# Patient Record
Sex: FEMALE | Race: WHITE | NOT HISPANIC OR LATINO
[De-identification: names, ages, dates, MRNs, and addresses within clinical notes are randomized per-mention and may not be internally consistent; named-entity substitution may affect disease eponyms.]

---

## 2017-08-08 ENCOUNTER — APPOINTMENT (OUTPATIENT)
Dept: OBGYN | Facility: CLINIC | Age: 57
End: 2017-08-08
Payer: COMMERCIAL

## 2017-08-08 VITALS
DIASTOLIC BLOOD PRESSURE: 82 MMHG | HEIGHT: 64 IN | SYSTOLIC BLOOD PRESSURE: 124 MMHG | HEART RATE: 79 BPM | WEIGHT: 127 LBS | BODY MASS INDEX: 21.68 KG/M2 | OXYGEN SATURATION: 98 %

## 2017-08-08 DIAGNOSIS — Z87.891 PERSONAL HISTORY OF NICOTINE DEPENDENCE: ICD-10-CM

## 2017-08-08 DIAGNOSIS — Q43.8 OTHER SPECIFIED CONGENITAL MALFORMATIONS OF INTESTINE: ICD-10-CM

## 2017-08-08 DIAGNOSIS — Z83.3 FAMILY HISTORY OF DIABETES MELLITUS: ICD-10-CM

## 2017-08-08 DIAGNOSIS — Z82.49 FAMILY HISTORY OF ISCHEMIC HEART DISEASE AND OTHER DISEASES OF THE CIRCULATORY SYSTEM: ICD-10-CM

## 2017-08-08 DIAGNOSIS — Z80.3 FAMILY HISTORY OF MALIGNANT NEOPLASM OF BREAST: ICD-10-CM

## 2017-08-08 PROCEDURE — 99203 OFFICE O/P NEW LOW 30 MIN: CPT

## 2017-08-10 LAB — HPV HIGH+LOW RISK DNA PNL CVX: NEGATIVE

## 2017-08-13 LAB — CYTOLOGY CVX/VAG DOC THIN PREP: NORMAL

## 2017-09-06 DIAGNOSIS — N39.0 URINARY TRACT INFECTION, SITE NOT SPECIFIED: ICD-10-CM

## 2018-11-12 ENCOUNTER — APPOINTMENT (OUTPATIENT)
Dept: OBGYN | Facility: CLINIC | Age: 58
End: 2018-11-12
Payer: COMMERCIAL

## 2018-11-12 VITALS
HEART RATE: 72 BPM | DIASTOLIC BLOOD PRESSURE: 80 MMHG | SYSTOLIC BLOOD PRESSURE: 126 MMHG | OXYGEN SATURATION: 99 % | WEIGHT: 130 LBS | HEIGHT: 64 IN | BODY MASS INDEX: 22.2 KG/M2

## 2018-11-12 DIAGNOSIS — K62.4 STENOSIS OF ANUS AND RECTUM: ICD-10-CM

## 2018-11-12 PROCEDURE — 99214 OFFICE O/P EST MOD 30 MIN: CPT | Mod: 25

## 2018-11-12 PROCEDURE — 99396 PREV VISIT EST AGE 40-64: CPT

## 2018-11-12 RX ORDER — NITROFURANTOIN MACROCRYSTALS 100 MG/1
100 CAPSULE ORAL
Qty: 1 | Refills: 3 | Status: DISCONTINUED | COMMUNITY
Start: 2017-09-06 | End: 2018-11-12

## 2018-11-13 LAB — HPV HIGH+LOW RISK DNA PNL CVX: NOT DETECTED

## 2018-11-14 LAB — CYTOLOGY CVX/VAG DOC THIN PREP: NORMAL

## 2018-11-27 ENCOUNTER — APPOINTMENT (OUTPATIENT)
Dept: GASTROENTEROLOGY | Facility: CLINIC | Age: 58
End: 2018-11-27
Payer: COMMERCIAL

## 2018-11-27 VITALS
SYSTOLIC BLOOD PRESSURE: 120 MMHG | BODY MASS INDEX: 22.53 KG/M2 | HEIGHT: 64 IN | WEIGHT: 132 LBS | DIASTOLIC BLOOD PRESSURE: 90 MMHG | OXYGEN SATURATION: 98 % | TEMPERATURE: 98.1 F | HEART RATE: 72 BPM

## 2018-11-27 DIAGNOSIS — Z00.00 ENCOUNTER FOR GENERAL ADULT MEDICAL EXAMINATION W/OUT ABNORMAL FINDINGS: ICD-10-CM

## 2018-11-27 PROCEDURE — 99204 OFFICE O/P NEW MOD 45 MIN: CPT

## 2018-11-29 LAB
ALBUMIN SERPL ELPH-MCNC: 4.5 G/DL
ALP BLD-CCNC: 89 U/L
ALT SERPL-CCNC: 56 U/L
ANION GAP SERPL CALC-SCNC: 12 MMOL/L
AST SERPL-CCNC: 39 U/L
BASOPHILS # BLD AUTO: 0.04 K/UL
BASOPHILS NFR BLD AUTO: 0.6 %
BILIRUB SERPL-MCNC: 0.3 MG/DL
BUN SERPL-MCNC: 12 MG/DL
CALCIUM SERPL-MCNC: 10.1 MG/DL
CHLORIDE SERPL-SCNC: 105 MMOL/L
CO2 SERPL-SCNC: 24 MMOL/L
CREAT SERPL-MCNC: 0.76 MG/DL
EOSINOPHIL # BLD AUTO: 0.15 K/UL
EOSINOPHIL NFR BLD AUTO: 2.2 %
GLUCOSE SERPL-MCNC: 79 MG/DL
HAV IGM SER QL: NONREACTIVE
HBV CORE IGG+IGM SER QL: NONREACTIVE
HBV SURFACE AB SER QL: NONREACTIVE
HBV SURFACE AG SER QL: NONREACTIVE
HCT VFR BLD CALC: 38.8 %
HCV AB SER QL: NONREACTIVE
HCV S/CO RATIO: 0.18 S/CO
HGB BLD-MCNC: 12.6 G/DL
IMM GRANULOCYTES NFR BLD AUTO: 0 %
LYMPHOCYTES # BLD AUTO: 2.08 K/UL
LYMPHOCYTES NFR BLD AUTO: 30.3 %
MAN DIFF?: NORMAL
MCHC RBC-ENTMCNC: 30.5 PG
MCHC RBC-ENTMCNC: 32.5 GM/DL
MCV RBC AUTO: 93.9 FL
MONOCYTES # BLD AUTO: 0.46 K/UL
MONOCYTES NFR BLD AUTO: 6.7 %
NEUTROPHILS # BLD AUTO: 4.13 K/UL
NEUTROPHILS NFR BLD AUTO: 60.2 %
PLATELET # BLD AUTO: 236 K/UL
POTASSIUM SERPL-SCNC: 4.4 MMOL/L
PROT SERPL-MCNC: 7.4 G/DL
RBC # BLD: 4.13 M/UL
RBC # FLD: 12.6 %
SODIUM SERPL-SCNC: 141 MMOL/L
WBC # FLD AUTO: 6.86 K/UL

## 2019-01-09 ENCOUNTER — APPOINTMENT (OUTPATIENT)
Dept: GASTROENTEROLOGY | Facility: CLINIC | Age: 59
End: 2019-01-09
Payer: COMMERCIAL

## 2019-01-09 VITALS
DIASTOLIC BLOOD PRESSURE: 80 MMHG | TEMPERATURE: 98 F | BODY MASS INDEX: 22.71 KG/M2 | WEIGHT: 133 LBS | SYSTOLIC BLOOD PRESSURE: 120 MMHG | OXYGEN SATURATION: 98 % | HEIGHT: 64 IN | HEART RATE: 70 BPM

## 2019-01-09 PROCEDURE — 99213 OFFICE O/P EST LOW 20 MIN: CPT

## 2019-01-09 NOTE — PHYSICAL EXAM
[General Appearance - Alert] : alert [General Appearance - In No Acute Distress] : in no acute distress [Sclera] : the sclera and conjunctiva were normal [PERRL With Normal Accommodation] : pupils were equal in size, round, and reactive to light [Extraocular Movements] : extraocular movements were intact [Outer Ear] : the ears and nose were normal in appearance [Oropharynx] : the oropharynx was normal [Neck Appearance] : the appearance of the neck was normal [Neck Cervical Mass (___cm)] : no neck mass was observed [Jugular Venous Distention Increased] : there was no jugular-venous distention [Thyroid Diffuse Enlargement] : the thyroid was not enlarged [Thyroid Nodule] : there were no palpable thyroid nodules [Auscultation Breath Sounds / Voice Sounds] : lungs were clear to auscultation bilaterally [Heart Rate And Rhythm] : heart rate was normal and rhythm regular [Heart Sounds] : normal S1 and S2 [Heart Sounds Gallop] : no gallops [Murmurs] : no murmurs [Heart Sounds Pericardial Friction Rub] : no pericardial rub [Edema] : there was no peripheral edema [Veins - Varicosity Changes] : there were no varicosital changes [Bowel Sounds] : normal bowel sounds [Abdomen Soft] : soft [Abdomen Tenderness] : non-tender [Abdomen Mass (___ Cm)] : no abdominal mass palpated [Cervical Lymph Nodes Enlarged Posterior Bilaterally] : posterior cervical [Cervical Lymph Nodes Enlarged Anterior Bilaterally] : anterior cervical [Supraclavicular Lymph Nodes Enlarged Bilaterally] : supraclavicular [No CVA Tenderness] : no ~M costovertebral angle tenderness [No Spinal Tenderness] : no spinal tenderness [Abnormal Walk] : normal gait [Nail Clubbing] : no clubbing  or cyanosis of the fingernails [Musculoskeletal - Swelling] : no joint swelling seen [Motor Tone] : muscle strength and tone were normal [Skin Color & Pigmentation] : normal skin color and pigmentation [Skin Turgor] : normal skin turgor [] : no rash [Deep Tendon Reflexes (DTR)] : deep tendon reflexes were 2+ and symmetric [Sensation] : the sensory exam was normal to light touch and pinprick [No Focal Deficits] : no focal deficits [Oriented To Time, Place, And Person] : oriented to person, place, and time [Impaired Insight] : insight and judgment were intact [Affect] : the affect was normal

## 2019-01-09 NOTE — HISTORY OF PRESENT ILLNESS
[FreeTextEntry1] : Patient presents back to the office today for followup and discussion. At last visit she had elevated transaminase level with otherwise negative testing. The patient is feeling well without complaint.\par \par

## 2019-01-09 NOTE — ASSESSMENT
[FreeTextEntry1] : Impression\par \par Patient presents today for followup of elevated transaminase levels. I will offer additional blood work today to include more exotic testing. Patient and I reviewed recent blood work as well sonogram showing no obvious cause for elevated reading. Patient will contact me for above results will decide what if any investigation will be performed

## 2019-01-22 LAB
ALBUMIN SERPL ELPH-MCNC: 4.7 G/DL
ALP BLD-CCNC: 90 U/L
ALT SERPL-CCNC: 55 U/L
ANA SER IF-ACNC: NEGATIVE
ANION GAP SERPL CALC-SCNC: 13 MMOL/L
AST SERPL-CCNC: 35 U/L
BILIRUB SERPL-MCNC: 0.5 MG/DL
BUN SERPL-MCNC: 15 MG/DL
CALCIUM SERPL-MCNC: 9.9 MG/DL
CERULOPLASMIN SERPL-MCNC: 28 MG/DL
CHLORIDE SERPL-SCNC: 103 MMOL/L
CO2 SERPL-SCNC: 25 MMOL/L
CREAT SERPL-MCNC: 0.78 MG/DL
ENDOMYSIUM IGA SER QL: POSITIVE
ENDOMYSIUM IGA TITR SER: ABNORMAL
FERRITIN SERPL-MCNC: 103 NG/ML
GLIADIN IGA SER QL: 12 UNITS
GLIADIN IGG SER QL: 21.9 UNITS
GLIADIN PEPTIDE IGA SER-ACNC: NEGATIVE
GLIADIN PEPTIDE IGG SER-ACNC: ABNORMAL
GLUCOSE SERPL-MCNC: 88 MG/DL
IGA SER QL IEP: 262 MG/DL
IRON SATN MFR SERPL: 32 %
IRON SERPL-MCNC: 122 UG/DL
MITOCHONDRIA AB SER IF-ACNC: NORMAL
POTASSIUM SERPL-SCNC: 4.3 MMOL/L
PROT SERPL-MCNC: 7.9 G/DL
SODIUM SERPL-SCNC: 141 MMOL/L
TIBC SERPL-MCNC: 377 UG/DL
TTG IGA SER IA-ACNC: 93.4 UNITS
TTG IGA SER-ACNC: POSITIVE
TTG IGG SER IA-ACNC: 8.9 UNITS
TTG IGG SER IA-ACNC: NEGATIVE
UIBC SERPL-MCNC: 255 UG/DL

## 2019-05-09 ENCOUNTER — APPOINTMENT (OUTPATIENT)
Dept: GASTROENTEROLOGY | Facility: AMBULATORY MEDICAL SERVICES | Age: 59
End: 2019-05-09
Payer: COMMERCIAL

## 2019-05-09 PROCEDURE — 43239 EGD BIOPSY SINGLE/MULTIPLE: CPT

## 2019-05-09 PROCEDURE — 45380 COLONOSCOPY AND BIOPSY: CPT

## 2019-05-15 ENCOUNTER — CLINICAL ADVICE (OUTPATIENT)
Age: 59
End: 2019-05-15

## 2019-06-11 ENCOUNTER — LABORATORY RESULT (OUTPATIENT)
Age: 59
End: 2019-06-11

## 2019-06-11 ENCOUNTER — APPOINTMENT (OUTPATIENT)
Dept: GASTROENTEROLOGY | Facility: CLINIC | Age: 59
End: 2019-06-11
Payer: COMMERCIAL

## 2019-06-11 VITALS
SYSTOLIC BLOOD PRESSURE: 110 MMHG | HEART RATE: 78 BPM | WEIGHT: 131 LBS | HEIGHT: 64 IN | TEMPERATURE: 98.5 F | BODY MASS INDEX: 22.36 KG/M2 | DIASTOLIC BLOOD PRESSURE: 80 MMHG | OXYGEN SATURATION: 98 %

## 2019-06-11 DIAGNOSIS — K57.30 DIVERTICULOSIS OF LARGE INTESTINE W/OUT PERFORATION OR ABSCESS W/OUT BLEEDING: ICD-10-CM

## 2019-06-11 PROCEDURE — 99215 OFFICE O/P EST HI 40 MIN: CPT | Mod: 25

## 2019-06-11 PROCEDURE — 36415 COLL VENOUS BLD VENIPUNCTURE: CPT

## 2019-06-11 RX ORDER — SODIUM SULFATE, POTASSIUM SULFATE, MAGNESIUM SULFATE 17.5; 3.13; 1.6 G/ML; G/ML; G/ML
17.5-3.13-1.6 SOLUTION, CONCENTRATE ORAL
Qty: 1 | Refills: 0 | Status: DISCONTINUED | COMMUNITY
Start: 2019-03-25 | End: 2019-06-11

## 2019-06-11 RX ORDER — CONJUGATED ESTROGENS 0.62 MG/G
0.62 CREAM VAGINAL
Qty: 42.5 | Refills: 6 | Status: DISCONTINUED | COMMUNITY
Start: 2017-08-08 | End: 2019-06-11

## 2019-06-11 NOTE — PHYSICAL EXAM
[General Appearance - Alert] : alert [Neck Appearance] : the appearance of the neck was normal [General Appearance - In No Acute Distress] : in no acute distress [Neck Cervical Mass (___cm)] : no neck mass was observed [Jugular Venous Distention Increased] : there was no jugular-venous distention [Thyroid Nodule] : there were no palpable thyroid nodules [Thyroid Diffuse Enlargement] : the thyroid was not enlarged [Auscultation Breath Sounds / Voice Sounds] : lungs were clear to auscultation bilaterally [Heart Rate And Rhythm] : heart rate was normal and rhythm regular [Heart Sounds] : normal S1 and S2 [Murmurs] : no murmurs [Heart Sounds Gallop] : no gallops [Heart Sounds Pericardial Friction Rub] : no pericardial rub [Edema] : there was no peripheral edema [Bowel Sounds] : normal bowel sounds [Abdomen Soft] : soft [Abdomen Tenderness] : non-tender [Abdomen Mass (___ Cm)] : no abdominal mass palpated [] : no hepato-splenomegaly [No CVA Tenderness] : no ~M costovertebral angle tenderness [No Spinal Tenderness] : no spinal tenderness [Oriented To Time, Place, And Person] : oriented to person, place, and time [Affect] : the affect was normal [Impaired Insight] : insight and judgment were intact

## 2019-06-13 PROBLEM — K57.30 DIVERTICULOSIS OF LARGE INTESTINE WITHOUT HEMORRHAGE: Status: ACTIVE | Noted: 2019-06-13

## 2019-06-13 NOTE — CONSULT LETTER
[FreeTextEntry1] : Dear Dr. Nancy Cardenas,\par \par I had the pleasure of seeing your patient GLENYS EARL in the office today.  My office note is attached.\par \par Thank you very much for allowing me to participate in the care of your patient.\par \par Sincerely,\par \par Parker Amanda M.D., FAC, FACP\par Director, Celiac Program at [facility]\par  of Medicine\par [employer] at [facility] Practice Director,\par [facility] - Gastroenterology/Internal Medicine at [place]\[facility] [address]\[place] [address]\par Tel: [phone]\par Email: [email or web address]\par \par \par The attached note has been created using a voice recognition system (Dragon).  There may be some misspellings and typos.  Please call my office if you have any issues or questions.

## 2019-06-13 NOTE — HISTORY OF PRESENT ILLNESS
[FreeTextEntry1] : The patient is a 58-year-old woman previously followed by my partner Dr. Alicia who is referred to me because of a new diagnosis of celiac disease. I reviewed her blood work from January 9, 2019 which revealed a tissue transglutaminase IgA of 93.4 with an elevated endomysial antibody and an ALT of 55. The patient underwent EGD and colonoscopy on May 9, 2019. EGD showed possible scalloping along with a hiatal hernia. Biopsies showed mildly increased intraepithelial lymphocytes in the distal duodenum and duodenal bulb. Colonoscopy was significant for diverticulosis and a hyperplastic polyp.\par \par The patient has been on a gluten-free diet. She denies abdominal pain, bloating, heartburn, dysphagia. She has one solid bowel movement a day without melena or prior blood per rectum. She has lost 5 pounds intentionally. She denies any rashes, joint pains, or dizziness. The patient has a history of osteopenia and had a bone density scan within the past year.\par \par  The patient has not been hospitalized in the past year and denies any cardiac issues.

## 2019-06-13 NOTE — ASSESSMENT
[FreeTextEntry1] : Patient with celiac serologies but a markedly abnormal suggestive of celiac disease. Endoscopic biopsies show mildly increased intraepithelial lymphocytes, a finding that can be seen in celiac disease but is not diagnostic of that. With the combination of the blood tests and biopsy results, assuming that the patient's celiac genetic testing is positive, this is consistent with a diagnosis of celiac disease. The patient has an elevated ALT. She had a recent colonoscopy revealing diverticulosis.\par \par I had a long discussion with the patient regarding celiac disease, the gluten-free diet, the concepts of contact and cross contamination, and the potential complications of celiac disease. We also discussed the need for ongoing followup.\par \par Information was given to the patient regarding celiac disease and a gluten free diet. The patient was also given a list of local restaurants that are "celiac friendly".\par \par Blood work was sent for LFTs, PT, alpha-1 antitrypsin, alpha-fetoprotein, VENESSA, ANCA, ceruloplasmin, iron studies, GGTP, celiac markers, hepatitis A., B., C. serologies, lipid profile, AMA, anti-smooth muscle antibody, anti-LKM antibody, anti-soluble liver antigen antibody, CBC, chem-pack, TSH, B12, folate, vitamin D, and magnesium and celiac HLA testing.\par \par The patient was given the name of a nutitionist, Wily Reeves, to see.\par \par Information was given to the patient regarding diverticulosis and a high-fiber diet.\par \par We will repeat a colonoscopy in 5-10 years.\par \par Patient returned to see me in 2 months.

## 2019-06-15 LAB
25(OH)D3 SERPL-MCNC: 34.5 NG/ML
A1AT SERPL-MCNC: 142 MG/DL
AFP-TM SERPL-MCNC: 4 NG/ML
ALBUMIN SERPL ELPH-MCNC: 4.6 G/DL
ALP BLD-CCNC: 91 U/L
ALT SERPL-CCNC: 26 U/L
ANA SER IF-ACNC: NEGATIVE
ANION GAP SERPL CALC-SCNC: 14 MMOL/L
AST SERPL-CCNC: 15 U/L
BASOPHILS # BLD AUTO: 0.04 K/UL
BASOPHILS NFR BLD AUTO: 0.7 %
BILIRUB SERPL-MCNC: 0.3 MG/DL
BUN SERPL-MCNC: 11 MG/DL
CALCIUM SERPL-MCNC: 10 MG/DL
CERULOPLASMIN SERPL-MCNC: 26 MG/DL
CHLORIDE SERPL-SCNC: 104 MMOL/L
CHOLEST SERPL-MCNC: 162 MG/DL
CHOLEST/HDLC SERPL: 3.3 RATIO
CO2 SERPL-SCNC: 24 MMOL/L
CREAT SERPL-MCNC: 0.77 MG/DL
ENDOMYSIUM IGA SER QL: NEGATIVE
ENDOMYSIUM IGA TITR SER: NORMAL
EOSINOPHIL # BLD AUTO: 0.18 K/UL
EOSINOPHIL NFR BLD AUTO: 2.9 %
FERRITIN SERPL-MCNC: 82 NG/ML
FOLATE SERPL-MCNC: 17.3 NG/ML
GGT SERPL-CCNC: 37 U/L
GLIADIN IGA SER QL: 7.9 UNITS
GLIADIN IGG SER QL: 24.3 UNITS
GLIADIN PEPTIDE IGA SER-ACNC: NEGATIVE
GLIADIN PEPTIDE IGG SER-ACNC: ABNORMAL
GLUCOSE SERPL-MCNC: 102 MG/DL
HBV CORE IGG+IGM SER QL: NONREACTIVE
HBV SURFACE AB SER QL: NONREACTIVE
HBV SURFACE AG SER QL: NONREACTIVE
HCT VFR BLD CALC: 41.9 %
HCV AB SER QL: NONREACTIVE
HCV S/CO RATIO: 0.19 S/CO
HDLC SERPL-MCNC: 49 MG/DL
HEPATITIS A IGG ANTIBODY: NONREACTIVE
HGB BLD-MCNC: 13.5 G/DL
IGA SER QL IEP: 241 MG/DL
IGG SER QL IEP: 1098 MG/DL
IMM GRANULOCYTES NFR BLD AUTO: 0.3 %
INR PPP: 1.03 RATIO
IRON SATN MFR SERPL: 28 %
IRON SERPL-MCNC: 89 UG/DL
LDLC SERPL CALC-MCNC: 87 MG/DL
LKM AB SER QL IF: <20.1 UNITS
LYMPHOCYTES # BLD AUTO: 1.89 K/UL
LYMPHOCYTES NFR BLD AUTO: 30.8 %
MAGNESIUM SERPL-MCNC: 2.1 MG/DL
MAN DIFF?: NORMAL
MCHC RBC-ENTMCNC: 30.6 PG
MCHC RBC-ENTMCNC: 32.2 GM/DL
MCV RBC AUTO: 95 FL
MITOCHONDRIA AB SER IF-ACNC: NORMAL
MONOCYTES # BLD AUTO: 0.45 K/UL
MONOCYTES NFR BLD AUTO: 7.3 %
MPO AB + PR3 PNL SER: NORMAL
NEUTROPHILS # BLD AUTO: 3.56 K/UL
NEUTROPHILS NFR BLD AUTO: 58 %
PLATELET # BLD AUTO: 199 K/UL
POTASSIUM SERPL-SCNC: 4.5 MMOL/L
PROT SERPL-MCNC: 7.2 G/DL
PT BLD: 11.6 SEC
RBC # BLD: 4.41 M/UL
RBC # FLD: 12 %
SMOOTH MUSCLE AB SER QL IF: ABNORMAL
SODIUM SERPL-SCNC: 142 MMOL/L
SOLUBLE LIVER IGG SER IA-ACNC: < 20.1 UNITS
TIBC SERPL-MCNC: 321 UG/DL
TRIGL SERPL-MCNC: 130 MG/DL
TSH SERPL-ACNC: 1.28 UIU/ML
TTG IGA SER IA-ACNC: 7.5 U/ML
TTG IGA SER-ACNC: ABNORMAL
TTG IGG SER IA-ACNC: 6.6 U/ML
TTG IGG SER IA-ACNC: ABNORMAL
UIBC SERPL-MCNC: 232 UG/DL
VIT B12 SERPL-MCNC: 474 PG/ML
WBC # FLD AUTO: 6.14 K/UL

## 2019-07-02 LAB — CELIAC GENETICS PROMETHEUS: NORMAL

## 2019-08-01 ENCOUNTER — APPOINTMENT (OUTPATIENT)
Dept: GASTROENTEROLOGY | Facility: CLINIC | Age: 59
End: 2019-08-01
Payer: COMMERCIAL

## 2019-08-01 VITALS
DIASTOLIC BLOOD PRESSURE: 80 MMHG | SYSTOLIC BLOOD PRESSURE: 118 MMHG | HEIGHT: 64 IN | WEIGHT: 128 LBS | TEMPERATURE: 98.4 F | BODY MASS INDEX: 21.85 KG/M2 | HEART RATE: 90 BPM | OXYGEN SATURATION: 99 %

## 2019-08-01 PROCEDURE — 36415 COLL VENOUS BLD VENIPUNCTURE: CPT

## 2019-08-01 PROCEDURE — 99214 OFFICE O/P EST MOD 30 MIN: CPT | Mod: 25

## 2019-08-01 NOTE — PHYSICAL EXAM
[Neck Appearance] : the appearance of the neck was normal [General Appearance - Alert] : alert [General Appearance - In No Acute Distress] : in no acute distress [Neck Cervical Mass (___cm)] : no neck mass was observed [Jugular Venous Distention Increased] : there was no jugular-venous distention [Thyroid Diffuse Enlargement] : the thyroid was not enlarged [Thyroid Nodule] : there were no palpable thyroid nodules [Auscultation Breath Sounds / Voice Sounds] : lungs were clear to auscultation bilaterally [Heart Sounds] : normal S1 and S2 [Heart Rate And Rhythm] : heart rate was normal and rhythm regular [Murmurs] : no murmurs [Heart Sounds Gallop] : no gallops [Heart Sounds Pericardial Friction Rub] : no pericardial rub [Edema] : there was no peripheral edema [Abdomen Soft] : soft [Bowel Sounds] : normal bowel sounds [Abdomen Tenderness] : non-tender [] : no hepato-splenomegaly [Abdomen Mass (___ Cm)] : no abdominal mass palpated [No CVA Tenderness] : no ~M costovertebral angle tenderness [No Spinal Tenderness] : no spinal tenderness [Oriented To Time, Place, And Person] : oriented to person, place, and time [Impaired Insight] : insight and judgment were intact [Affect] : the affect was normal

## 2019-08-02 NOTE — ASSESSMENT
[FreeTextEntry1] : Patient with celiac disease whose labs are improving but not yet normalized. She appears to be 100% gluten free diet and is being followed by a nutritionist.\par \par I had a long discussion with the patient regarding celiac disease, the gluten-free diet, and the concepts of contact and cross contamination.\par \par Bloodwork was sent for CBC, chem-pack, TSH, celiac markers, iron studies, B12, folate, vitamin D, and magnesium.\par \par Patient will return to see me in 4 months.\par \par We will plan on repeating EGD in May 2020 and colonoscopy in May 2024.\par \par \par Plan from 6/11/19 - Patient with celiac serologies but a markedly abnormal suggestive of celiac disease. Endoscopic biopsies show mildly increased intraepithelial lymphocytes, a finding that can be seen in celiac disease but is not diagnostic of that. With the combination of the blood tests and biopsy results, assuming that the patient's celiac genetic testing is positive, this is consistent with a diagnosis of celiac disease. The patient has an elevated ALT. She had a recent colonoscopy revealing diverticulosis.\par \par I had a long discussion with the patient regarding celiac disease, the gluten-free diet, the concepts of contact and cross contamination, and the potential complications of celiac disease. We also discussed the need for ongoing followup.\par \par Information was given to the patient regarding celiac disease and a gluten free diet. The patient was also given a list of local restaurants that are "celiac friendly".\par \par Blood work was sent for LFTs, PT, alpha-1 antitrypsin, alpha-fetoprotein, VENESSA, ANCA, ceruloplasmin, iron studies, GGTP, celiac markers, hepatitis A., B., C. serologies, lipid profile, AMA, anti-smooth muscle antibody, anti-LKM antibody, anti-soluble liver antigen antibody, CBC, chem-pack, TSH, B12, folate, vitamin D, and magnesium and celiac HLA testing.\par \par The patient was given the name of a nutitionist, Wily Reeves, to see.\par \par Information was given to the patient regarding diverticulosis and a high-fiber diet.\par \par We will repeat a colonoscopy in 5-10 years.\par \par Patient returned to see me in 2 months.

## 2019-08-02 NOTE — HISTORY OF PRESENT ILLNESS
[FreeTextEntry1] : We reviewed the blood work from the patient's last visit on June 11, 2019. There is improvement of the celiac labs although the tissue transglutaminase IgA weakly positive as is the tissue transglutaminase IgG and the anti-deamidated gliadin antibody IgG. 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 The patient states that she has been on a 100% gluten free diet. She saw the nutritionist and understands the requirements of the diet. She denies abdominal pain or bloating. She has one solid bowel movement a day without melena or bright blood per rectum. Patient has lost 3 pounds intentionally.\par \par \par Note from 6/11/19 - The patient is a 58-year-old woman previously followed by my partner Dr. Alicia who is referred to me because of a new diagnosis of celiac disease. I reviewed her blood work from January 9, 2019 which revealed a tissue transglutaminase IgA of 93.4 with an elevated endomysial antibody and an ALT of 55. The patient underwent EGD and colonoscopy on May 9, 2019. EGD showed possible scalloping along with a hiatal hernia. Biopsies showed mildly increased intraepithelial lymphocytes in the distal duodenum and duodenal bulb. Colonoscopy was significant for diverticulosis and a hyperplastic polyp.\par \par The patient has been on a gluten-free diet. She denies abdominal pain, bloating, heartburn, dysphagia. She has one solid bowel movement a day without melena or prior blood per rectum. She has lost 5 pounds intentionally. She denies any rashes, joint pains, or dizziness. The patient has a history of osteopenia and had a bone density scan within the past year.\par \par  The patient has not been hospitalized in the past year and denies any cardiac issues.

## 2019-08-02 NOTE — CONSULT LETTER
[FreeTextEntry1] : Dear Dr. Nancy Cardenas,\par \par I had the pleasure of seeing your patient GLENYS EARL in the office today.  My office note is attached.\par \par Thank you very much for allowing me to participate in the care of your patient.\par \par Sincerely,\par \par Parker Amanda M.D., FAC, FACP\par Director, Celiac Program at [facility]\par  of Medicine\par [employer] at [facility] Practice Director,\par [facility] - Gastroenterology/Internal Medicine at [place]\[facility] [address]\[place] [address]\par Tel: [phone]\par Email: [email or web address]\par \par \par The attached note has been created using a voice recognition system (Dragon).  There may be some misspellings and typos.  Please call my office if you have any issues or questions.

## 2019-08-07 LAB
ENDOMYSIUM IGA SER QL: NEGATIVE
ENDOMYSIUM IGA TITR SER: NORMAL
GLIADIN IGA SER QL: 7.4 UNITS
GLIADIN IGG SER QL: 14.6 UNITS
GLIADIN PEPTIDE IGA SER-ACNC: NEGATIVE
GLIADIN PEPTIDE IGG SER-ACNC: NEGATIVE
IGA SER QL IEP: 232 MG/DL
TTG IGA SER IA-ACNC: 4.9 U/ML
TTG IGA SER-ACNC: ABNORMAL
TTG IGG SER IA-ACNC: 6.5 U/ML
TTG IGG SER IA-ACNC: ABNORMAL

## 2019-12-05 ENCOUNTER — RX RENEWAL (OUTPATIENT)
Age: 59
End: 2019-12-05

## 2019-12-05 DIAGNOSIS — Z86.19 PERSONAL HISTORY OF OTHER INFECTIOUS AND PARASITIC DISEASES: ICD-10-CM

## 2019-12-09 ENCOUNTER — APPOINTMENT (OUTPATIENT)
Dept: GASTROENTEROLOGY | Facility: CLINIC | Age: 59
End: 2019-12-09
Payer: COMMERCIAL

## 2019-12-09 VITALS
HEART RATE: 73 BPM | SYSTOLIC BLOOD PRESSURE: 112 MMHG | TEMPERATURE: 98.1 F | BODY MASS INDEX: 22.71 KG/M2 | WEIGHT: 133 LBS | DIASTOLIC BLOOD PRESSURE: 70 MMHG | OXYGEN SATURATION: 99 % | HEIGHT: 64 IN

## 2019-12-09 PROCEDURE — 36415 COLL VENOUS BLD VENIPUNCTURE: CPT

## 2019-12-09 PROCEDURE — 99213 OFFICE O/P EST LOW 20 MIN: CPT | Mod: 25

## 2019-12-09 NOTE — HISTORY OF PRESENT ILLNESS
[FreeTextEntry1] : The patient has celiac disease.  Her blood work from her last visit in August was improved with weakly positive tissue transglutaminase IgG and IgA but normal deaminase gliadin antibodies.  The patient reports that she is on a 100% gluten-free diet.  She denies abdominal pain or bloating.  Bowel movements are normal.\par \par \par Note from 8/1/2019 - We reviewed the blood work from the patient's last visit on June 11, 2019. There is improvement of the celiac labs although the tissue transglutaminase IgA weakly positive as is the tissue transglutaminase IgG and the anti-deamidated gliadin antibody IgG. 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 The patient states that she has been on a 100% gluten free diet. She saw the nutritionist and understands the requirements of the diet. She denies abdominal pain or bloating. She has one solid bowel movement a day without melena or bright blood per rectum. Patient has lost 3 pounds intentionally.\par \par \par Note from 6/11/19 - The patient is a 58-year-old woman previously followed by my partner Dr. Alicia who is referred to me because of a new diagnosis of celiac disease. I reviewed her blood work from January 9, 2019 which revealed a tissue transglutaminase IgA of 93.4 with an elevated endomysial antibody and an ALT of 55. The patient underwent EGD and colonoscopy on May 9, 2019. EGD showed possible scalloping along with a hiatal hernia. Biopsies showed mildly increased intraepithelial lymphocytes in the distal duodenum and duodenal bulb. Colonoscopy was significant for diverticulosis and a hyperplastic polyp.\par \par The patient has been on a gluten-free diet. She denies abdominal pain, bloating, heartburn, dysphagia. She has one solid bowel movement a day without melena or prior blood per rectum. She has lost 5 pounds intentionally. She denies any rashes, joint pains, or dizziness. The patient has a history of osteopenia and had a bone density scan within the past year.\par \par  The patient has not been hospitalized in the past year and denies any cardiac issues.

## 2019-12-09 NOTE — ASSESSMENT
[FreeTextEntry1] : Patient with celiac disease diagnosed in May who has been on a 100% gluten-free diet.  We have not yet seen full normalization of her serologies but they are improving.\par \par Bloodwork was sent for CBC, chem-pack, TSH, celiac markers, iron studies, B12, folate, vitamin A, D, K, and magnesium.\par \par Patient will return to see me in 4 months.  We will plan on repeating an EGD in May 2020.  Patient is due for colonoscopy in May 2024.\par \par \par Plan from 8/1/2019 - Patient with celiac disease whose labs are improving but not yet normalized. She appears to be 100% gluten free diet and is being followed by a nutritionist.\par \par I had a long discussion with the patient regarding celiac disease, the gluten-free diet, and the concepts of contact and cross contamination.\par \par Bloodwork was sent for CBC, chem-pack, TSH, celiac markers, iron studies, B12, folate, vitamin D, and magnesium.\par \par Patient will return to see me in 4 months.\par \par We will plan on repeating EGD in May 2020 and colonoscopy in May 2024.\par \par \par Plan from 6/11/19 - Patient with celiac serologies but a markedly abnormal suggestive of celiac disease. Endoscopic biopsies show mildly increased intraepithelial lymphocytes, a finding that can be seen in celiac disease but is not diagnostic of that. With the combination of the blood tests and biopsy results, assuming that the patient's celiac genetic testing is positive, this is consistent with a diagnosis of celiac disease. The patient has an elevated ALT. She had a recent colonoscopy revealing diverticulosis.\par \par I had a long discussion with the patient regarding celiac disease, the gluten-free diet, the concepts of contact and cross contamination, and the potential complications of celiac disease. We also discussed the need for ongoing followup.\par \par Information was given to the patient regarding celiac disease and a gluten free diet. The patient was also given a list of local restaurants that are "celiac friendly".\par \par Blood work was sent for LFTs, PT, alpha-1 antitrypsin, alpha-fetoprotein, VENESSA, ANCA, ceruloplasmin, iron studies, GGTP, celiac markers, hepatitis A., B., C. serologies, lipid profile, AMA, anti-smooth muscle antibody, anti-LKM antibody, anti-soluble liver antigen antibody, CBC, chem-pack, TSH, B12, folate, vitamin D, and magnesium and celiac HLA testing.\par \par The patient was given the name of a nutitionist, Wily Reeves, to see.\par \par Information was given to the patient regarding diverticulosis and a high-fiber diet.\par \par We will repeat a colonoscopy in 5-10 years.\par \par Patient returned to see me in 2 months.

## 2019-12-09 NOTE — CONSULT LETTER
[FreeTextEntry1] : Dear Dr. Nancy Cardenas,\par \par I had the pleasure of seeing your patient GLENYS EARL in the office today.  My office note is attached.\par \par Thank you very much for allowing me to participate in the care of your patient.\par \par Sincerely,\par \par Parker Amanda M.D., FAC, FACP\par Director, Celiac Program at [facility]\par  of Medicine\par [employer] at [facility] Practice Director,\par [facility] - Gastroenterology/Internal Medicine at [place]\[facility] [address]\[place] [address]\par Tel: [phone]\par Email: [email or web address]\par \par \par The attached note has been created using a voice recognition system (Dragon).  There may be some misspellings and typos.  Please call my office if you have any issues or questions.

## 2019-12-17 LAB
25(OH)D3 SERPL-MCNC: 32.2 NG/ML
ALBUMIN SERPL ELPH-MCNC: 4.7 G/DL
ALP BLD-CCNC: 101 U/L
ALT SERPL-CCNC: 174 U/L
ANION GAP SERPL CALC-SCNC: 12 MMOL/L
AST SERPL-CCNC: 52 U/L
BASOPHILS # BLD AUTO: 0.04 K/UL
BASOPHILS NFR BLD AUTO: 0.7 %
BILIRUB SERPL-MCNC: 0.3 MG/DL
BUN SERPL-MCNC: 12 MG/DL
CALCIUM SERPL-MCNC: 9.9 MG/DL
CHLORIDE SERPL-SCNC: 104 MMOL/L
CO2 SERPL-SCNC: 26 MMOL/L
CREAT SERPL-MCNC: 0.76 MG/DL
ENDOMYSIUM IGA SER QL: NEGATIVE
ENDOMYSIUM IGA TITR SER: NORMAL
EOSINOPHIL # BLD AUTO: 0.19 K/UL
EOSINOPHIL NFR BLD AUTO: 3.2 %
FERRITIN SERPL-MCNC: 95 NG/ML
FOLATE SERPL-MCNC: 14.1 NG/ML
GLIADIN IGA SER QL: 13.3 UNITS
GLIADIN IGG SER QL: 11.6 UNITS
GLIADIN PEPTIDE IGA SER-ACNC: NEGATIVE
GLIADIN PEPTIDE IGG SER-ACNC: NEGATIVE
GLUCOSE SERPL-MCNC: 98 MG/DL
HCT VFR BLD CALC: 41.8 %
HGB BLD-MCNC: 13.5 G/DL
IGA SER QL IEP: 275 MG/DL
IMM GRANULOCYTES NFR BLD AUTO: 0.2 %
IRON SATN MFR SERPL: 38 %
IRON SERPL-MCNC: 126 UG/DL
LYMPHOCYTES # BLD AUTO: 1.98 K/UL
LYMPHOCYTES NFR BLD AUTO: 32.8 %
MAGNESIUM SERPL-MCNC: 2.2 MG/DL
MAN DIFF?: NORMAL
MCHC RBC-ENTMCNC: 31.2 PG
MCHC RBC-ENTMCNC: 32.3 GM/DL
MCV RBC AUTO: 96.5 FL
MENADIONE SERPL-MCNC: 0.55 NG/ML
MONOCYTES # BLD AUTO: 0.45 K/UL
MONOCYTES NFR BLD AUTO: 7.5 %
NEUTROPHILS # BLD AUTO: 3.36 K/UL
NEUTROPHILS NFR BLD AUTO: 55.6 %
PLATELET # BLD AUTO: 240 K/UL
POTASSIUM SERPL-SCNC: 3.7 MMOL/L
PROT SERPL-MCNC: 7.2 G/DL
RBC # BLD: 4.33 M/UL
RBC # FLD: 11.9 %
SODIUM SERPL-SCNC: 142 MMOL/L
TIBC SERPL-MCNC: 328 UG/DL
TSH SERPL-ACNC: 1.42 UIU/ML
TTG IGA SER IA-ACNC: 4.4 U/ML
TTG IGA SER-ACNC: ABNORMAL
TTG IGG SER IA-ACNC: 7.7 U/ML
TTG IGG SER IA-ACNC: ABNORMAL
UIBC SERPL-MCNC: 202 UG/DL
VIT A SERPL-MCNC: 49.1 UG/DL
VIT B12 SERPL-MCNC: 681 PG/ML
WBC # FLD AUTO: 6.03 K/UL

## 2020-01-13 LAB
ALBUMIN SERPL ELPH-MCNC: 4.4 G/DL
ALP BLD-CCNC: 93 U/L
ALT SERPL-CCNC: 43 U/L
AST SERPL-CCNC: 24 U/L
BILIRUB DIRECT SERPL-MCNC: 0.1 MG/DL
BILIRUB INDIRECT SERPL-MCNC: 0.2 MG/DL
BILIRUB SERPL-MCNC: 0.3 MG/DL
GGT SERPL-CCNC: 67 U/L
PROT SERPL-MCNC: 6.7 G/DL

## 2020-04-26 LAB
25(OH)D3 SERPL-MCNC: 28.3 NG/ML
ALBUMIN SERPL ELPH-MCNC: 4.5 G/DL
ALP BLD-CCNC: 87 U/L
ALT SERPL-CCNC: 23 U/L
ANION GAP SERPL CALC-SCNC: 12 MMOL/L
AST SERPL-CCNC: 17 U/L
BASOPHILS # BLD AUTO: 0.04 K/UL
BASOPHILS NFR BLD AUTO: 0.6 %
BILIRUB SERPL-MCNC: 0.2 MG/DL
BUN SERPL-MCNC: 17 MG/DL
CALCIUM SERPL-MCNC: 9.1 MG/DL
CAROTENE SERPL-MCNC: 77 MCG/DL
CHLORIDE SERPL-SCNC: 104 MMOL/L
CO2 SERPL-SCNC: 24 MMOL/L
CREAT SERPL-MCNC: 0.82 MG/DL
ENDOMYSIUM IGA SER QL: NEGATIVE
ENDOMYSIUM IGA TITR SER: NORMAL
EOSINOPHIL # BLD AUTO: 0.12 K/UL
EOSINOPHIL NFR BLD AUTO: 1.9 %
FERRITIN SERPL-MCNC: 37 NG/ML
FOLATE SERPL-MCNC: 12.2 NG/ML
GLIADIN IGA SER QL: 13.2 UNITS
GLIADIN IGG SER QL: 8.8 UNITS
GLIADIN PEPTIDE IGA SER-ACNC: NEGATIVE
GLIADIN PEPTIDE IGG SER-ACNC: NEGATIVE
GLUCOSE SERPL-MCNC: 111 MG/DL
HCT VFR BLD CALC: 36.7 %
HGB BLD-MCNC: 11.8 G/DL
IGA SER QL IEP: 219 MG/DL
IMM GRANULOCYTES NFR BLD AUTO: 0.2 %
IRON SATN MFR SERPL: 29 %
IRON SERPL-MCNC: 89 UG/DL
LYMPHOCYTES # BLD AUTO: 1.69 K/UL
LYMPHOCYTES NFR BLD AUTO: 26.5 %
MAGNESIUM SERPL-MCNC: 2 MG/DL
MAN DIFF?: NORMAL
MCHC RBC-ENTMCNC: 30.9 PG
MCHC RBC-ENTMCNC: 32.2 GM/DL
MCV RBC AUTO: 96.1 FL
MONOCYTES # BLD AUTO: 0.44 K/UL
MONOCYTES NFR BLD AUTO: 6.9 %
NEUTROPHILS # BLD AUTO: 4.08 K/UL
NEUTROPHILS NFR BLD AUTO: 63.9 %
PLATELET # BLD AUTO: 241 K/UL
POTASSIUM SERPL-SCNC: 4.3 MMOL/L
PROT SERPL-MCNC: 6.6 G/DL
RBC # BLD: 3.82 M/UL
RBC # FLD: 12.5 %
SODIUM SERPL-SCNC: 140 MMOL/L
TIBC SERPL-MCNC: 302 UG/DL
TSH SERPL-ACNC: 1.14 UIU/ML
TTG IGA SER IA-ACNC: 3.4 U/ML
TTG IGA SER-ACNC: NEGATIVE
TTG IGG SER IA-ACNC: 6.3 U/ML
TTG IGG SER IA-ACNC: ABNORMAL
UIBC SERPL-MCNC: 214 UG/DL
VIT A SERPL-MCNC: 48.1 UG/DL
VIT B12 SERPL-MCNC: 459 PG/ML
VIT B6 SERPL-MCNC: 12.1 UG/L
WBC # FLD AUTO: 6.38 K/UL
ZINC SERPL-MCNC: 80 UG/DL

## 2020-04-27 LAB — MENADIONE SERPL-MCNC: 1.13 NG/ML

## 2020-05-12 ENCOUNTER — APPOINTMENT (OUTPATIENT)
Dept: GASTROENTEROLOGY | Facility: CLINIC | Age: 60
End: 2020-05-12

## 2020-05-12 ENCOUNTER — APPOINTMENT (OUTPATIENT)
Dept: GASTROENTEROLOGY | Facility: CLINIC | Age: 60
End: 2020-05-12
Payer: COMMERCIAL

## 2020-05-12 DIAGNOSIS — E55.9 VITAMIN D DEFICIENCY, UNSPECIFIED: ICD-10-CM

## 2020-05-12 PROCEDURE — 99442: CPT

## 2020-05-12 NOTE — HISTORY OF PRESENT ILLNESS
[Home] : at home, [unfilled] , at the time of the visit. [Other Location: e.g. Home (Enter Location, City,State)___] : at [unfilled] [Patient] : the patient [Verbal consent obtained from patient] : the patient, [unfilled] [FreeTextEntry1] : The patient was scheduled for a telehealth visit but her Internet was not working and the visit was converted to a telephonic visit.\par \par The patient has celiac disease and reports being on a 100% gluten-free diet.  We reviewed her blood work from April 21, 2020 which was significant for a weakly positive tissue transglutaminase IgG of 6.3 and a vitamin D level of 28.3.  The patient is now on vitamin D3 supplementation.  She reports being strictly gluten-free and is very conscious about contact and cross-contamination.  She does note that she uses a lot of Chapstick, which may not be gluten-free.  She feels well denying abdominal pain, heartburn, dysphasia.  Bowel movements are normal without melena or bright red blood per rectum.  The patient's weight is stable.  The patient has not been admitted to the hospital in the past year and denies any cardiac issues.\par \par \par Note from 12/9/2019 - The patient has celiac disease.  Her blood work from her last visit in August was improved with weakly positive tissue transglutaminase IgG and IgA but normal deaminase gliadin antibodies.  The patient reports that she is on a 100% gluten-free diet.  She denies abdominal pain or bloating.  Bowel movements are normal.\par \par \par Note from 8/1/2019 - We reviewed the blood work from the patient's last visit on June 11, 2019. There is improvement of the celiac labs although the tissue transglutaminase IgA weakly positive as is the tissue transglutaminase IgG and the anti-deamidated gliadin antibody IgG. 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 The patient states that she has been on a 100% gluten free diet. She saw the nutritionist and understands the requirements of the diet. She denies abdominal pain or bloating. She has one solid bowel movement a day without melena or bright blood per rectum. Patient has lost 3 pounds intentionally.\par \par \par Note from 6/11/19 - The patient is a 58-year-old woman previously followed by my partner Dr. Alicia who is referred to me because of a new diagnosis of celiac disease. I reviewed her blood work from January 9, 2019 which revealed a tissue transglutaminase IgA of 93.4 with an elevated endomysial antibody and an ALT of 55. The patient underwent EGD and colonoscopy on May 9, 2019. EGD showed possible scalloping along with a hiatal hernia. Biopsies showed mildly increased intraepithelial lymphocytes in the distal duodenum and duodenal bulb. Colonoscopy was significant for diverticulosis and a hyperplastic polyp.\par \par The patient has been on a gluten-free diet. She denies abdominal pain, bloating, heartburn, dysphagia. She has one solid bowel movement a day without melena or prior blood per rectum. She has lost 5 pounds intentionally. She denies any rashes, joint pains, or dizziness. The patient has a history of osteopenia and had a bone density scan within the past year.\par \par  The patient has not been hospitalized in the past year and denies any cardiac issues.

## 2020-05-12 NOTE — ASSESSMENT
[FreeTextEntry1] : Patient with celiac disease who reports being on a 100% gluten-free diet.  Blood work was significant for a weakly positive tissue transglutaminase IgG.  She also has mildly decreased vitamin D.\par \par Once the COVID-19 pandemic allows, an EGD will be scheduled. The risks, benefits, alternatives, and limitations of the procedure were explained.\par \par I discussed with the patient the possibility that gluten is inadvertently entering her body.  We discussed the Chapstick and the patient will look for a gluten-free alternative.  We also discussed receiving the dietitian, although the patient will wait to see if the EGD shows findings of ongoing celiac damage.\par \par Patient is due for colonoscopy in May 2024.\par \par \par Plan from 12/9/2019 - Patient with celiac disease diagnosed in May who has been on a 100% gluten-free diet.  We have not yet seen full normalization of her serologies but they are improving.\par \par Bloodwork was sent for CBC, chem-pack, TSH, celiac markers, iron studies, B12, folate, vitamin A, D, K, and magnesium.\par \par Patient will return to see me in 4 months.  We will plan on repeating an EGD in May 2020.  Patient is due for colonoscopy in May 2024.\par \par \par Plan from 8/1/2019 - Patient with celiac disease whose labs are improving but not yet normalized. She appears to be 100% gluten free diet and is being followed by a nutritionist.\par \par I had a long discussion with the patient regarding celiac disease, the gluten-free diet, and the concepts of contact and cross contamination.\par \par Bloodwork was sent for CBC, chem-pack, TSH, celiac markers, iron studies, B12, folate, vitamin D, and magnesium.\par \par Patient will return to see me in 4 months.\par \par We will plan on repeating EGD in May 2020 and colonoscopy in May 2024.\par \par \par Plan from 6/11/19 - Patient with celiac serologies but a markedly abnormal suggestive of celiac disease. Endoscopic biopsies show mildly increased intraepithelial lymphocytes, a finding that can be seen in celiac disease but is not diagnostic of that. With the combination of the blood tests and biopsy results, assuming that the patient's celiac genetic testing is positive, this is consistent with a diagnosis of celiac disease. The patient has an elevated ALT. She had a recent colonoscopy revealing diverticulosis.\par \par I had a long discussion with the patient regarding celiac disease, the gluten-free diet, the concepts of contact and cross contamination, and the potential complications of celiac disease. We also discussed the need for ongoing followup.\par \par Information was given to the patient regarding celiac disease and a gluten free diet. The patient was also given a list of local restaurants that are "celiac friendly".\par \par Blood work was sent for LFTs, PT, alpha-1 antitrypsin, alpha-fetoprotein, VENESSA, ANCA, ceruloplasmin, iron studies, GGTP, celiac markers, hepatitis A., B., C. serologies, lipid profile, AMA, anti-smooth muscle antibody, anti-LKM antibody, anti-soluble liver antigen antibody, CBC, chem-pack, TSH, B12, folate, vitamin D, and magnesium and celiac HLA testing.\par \par The patient was given the name of a nutitionist, Wily Reeves, to see.\par \par Information was given to the patient regarding diverticulosis and a high-fiber diet.\par \par We will repeat a colonoscopy in 5-10 years.\par \par Patient returned to see me in 2 months.

## 2020-05-12 NOTE — CONSULT LETTER
[FreeTextEntry1] : Dear Dr. Nancy Cardenas,\[facility] \[facility] I had the pleasure of seeing your patient GLENYS EARL in the office today.  My office note is attached. PLEASE READ THE "ASSESSMENT" SECTION OF THE NOTE TO SEE MY IMPRESSION AND PLAN.\par \[facility] Thank you very much for allowing me to participate in the care of your patient.\[facility] \[facility] Sincerely,\[facility] \[facility] Parker Amanda M.D., FAC, [facility] Director, Celiac Program at [facility]  of Medicine\[facility] [employer] at [facility] Practice Director,[facility] - Gastroenterology/Internal Medicine at [place]\[facility] [address]\[place] [address]\[facility] Tel: [phone]\[facility] Email: [email or web address] \[facility] \[facility] The attached note has been created using a voice recognition system (Dragon).  There may be some misspellings and typos.  Please call my office if you have any issues or questions.

## 2020-09-30 DIAGNOSIS — Z01.818 ENCOUNTER FOR OTHER PREPROCEDURAL EXAMINATION: ICD-10-CM

## 2020-10-07 DIAGNOSIS — Z20.828 CONTACT WITH AND (SUSPECTED) EXPOSURE TO OTHER VIRAL COMMUNICABLE DISEASES: ICD-10-CM

## 2020-10-07 DIAGNOSIS — Z01.818 ENCOUNTER FOR OTHER PREPROCEDURAL EXAMINATION: ICD-10-CM

## 2020-10-13 LAB — SARS-COV-2 N GENE NPH QL NAA+PROBE: NOT DETECTED

## 2020-10-16 ENCOUNTER — APPOINTMENT (OUTPATIENT)
Dept: GASTROENTEROLOGY | Facility: AMBULATORY MEDICAL SERVICES | Age: 60
End: 2020-10-16
Payer: COMMERCIAL

## 2020-10-16 PROCEDURE — 43239 EGD BIOPSY SINGLE/MULTIPLE: CPT

## 2020-11-02 ENCOUNTER — APPOINTMENT (OUTPATIENT)
Dept: GASTROENTEROLOGY | Facility: CLINIC | Age: 60
End: 2020-11-02
Payer: COMMERCIAL

## 2020-11-02 VITALS
HEIGHT: 64 IN | TEMPERATURE: 97.7 F | OXYGEN SATURATION: 97 % | DIASTOLIC BLOOD PRESSURE: 90 MMHG | HEART RATE: 86 BPM | WEIGHT: 130 LBS | BODY MASS INDEX: 22.2 KG/M2 | SYSTOLIC BLOOD PRESSURE: 140 MMHG

## 2020-11-02 DIAGNOSIS — K44.9 DIAPHRAGMATIC HERNIA W/OUT OBSTRUCTION OR GANGRENE: ICD-10-CM

## 2020-11-02 PROCEDURE — 99214 OFFICE O/P EST MOD 30 MIN: CPT | Mod: 25

## 2020-11-02 PROCEDURE — 99072 ADDL SUPL MATRL&STAF TM PHE: CPT

## 2020-11-02 PROCEDURE — 36415 COLL VENOUS BLD VENIPUNCTURE: CPT

## 2020-11-02 NOTE — ASSESSMENT
[FreeTextEntry1] : Patient with celiac disease who is elevated AST, ALT, and alkaline phosphatase.  EGD revealed a 1 cm hiatal hernia with biopsy evidence of reflux esophagitis.\par \par Bloodwork was sent for LFTs, AFP, hepatitis B and C serologies, celiac markers, iron studies, B12, folate, vitamin A, vitamin D, vitamin K, magnesium, carotene, vitamin B6, zinc.\par \par Patient was sent for an abdominal ultrasound.\par \par Patient is due for colonoscopy in May 2024.\par \par \par Plan from 5/12/2020 - Patient with celiac disease who reports being on a 100% gluten-free diet.  Blood work was significant for a weakly positive tissue transglutaminase IgG.  She also has mildly decreased vitamin D.\par \par Once the COVID-19 pandemic allows, an EGD will be scheduled. The risks, benefits, alternatives, and limitations of the procedure were explained.\par \par I discussed with the patient the possibility that gluten is inadvertently entering her body.  We discussed the Chapstick and the patient will look for a gluten-free alternative.  We also discussed receiving the dietitian, although the patient will wait to see if the EGD shows findings of ongoing celiac damage.\par \par Patient is due for colonoscopy in May 2024.\par \par \par Plan from 12/9/2019 - Patient with celiac disease diagnosed in May who has been on a 100% gluten-free diet.  We have not yet seen full normalization of her serologies but they are improving.\par \par Bloodwork was sent for CBC, chem-pack, TSH, celiac markers, iron studies, B12, folate, vitamin A, D, K, and magnesium.\par \par Patient will return to see me in 4 months.  We will plan on repeating an EGD in May 2020.  Patient is due for colonoscopy in May 2024.\par \par \par Plan from 8/1/2019 - Patient with celiac disease whose labs are improving but not yet normalized. She appears to be 100% gluten free diet and is being followed by a nutritionist.\par \par I had a long discussion with the patient regarding celiac disease, the gluten-free diet, and the concepts of contact and cross contamination.\par \par Bloodwork was sent for CBC, chem-pack, TSH, celiac markers, iron studies, B12, folate, vitamin D, and magnesium.\par \par Patient will return to see me in 4 months.\par \par We will plan on repeating EGD in May 2020 and colonoscopy in May 2024.\par \par \par Plan from 6/11/19 - Patient with celiac serologies but a markedly abnormal suggestive of celiac disease. Endoscopic biopsies show mildly increased intraepithelial lymphocytes, a finding that can be seen in celiac disease but is not diagnostic of that. With the combination of the blood tests and biopsy results, assuming that the patient's celiac genetic testing is positive, this is consistent with a diagnosis of celiac disease. The patient has an elevated ALT. She had a recent colonoscopy revealing diverticulosis.\par \par I had a long discussion with the patient regarding celiac disease, the gluten-free diet, the concepts of contact and cross contamination, and the potential complications of celiac disease. We also discussed the need for ongoing followup.\par \par Information was given to the patient regarding celiac disease and a gluten free diet. The patient was also given a list of local restaurants that are "celiac friendly".\par \par Blood work was sent for LFTs, PT, alpha-1 antitrypsin, alpha-fetoprotein, VENESSA, ANCA, ceruloplasmin, iron studies, GGTP, celiac markers, hepatitis A., B., C. serologies, lipid profile, AMA, anti-smooth muscle antibody, anti-LKM antibody, anti-soluble liver antigen antibody, CBC, chem-pack, TSH, B12, folate, vitamin D, and magnesium and celiac HLA testing.\par \par The patient was given the name of a nutitionist, Wily Reeves, to see.\par \par Information was given to the patient regarding diverticulosis and a high-fiber diet.\par \par We will repeat a colonoscopy in 5-10 years.\par \par Patient returned to see me in 2 months.

## 2020-11-02 NOTE — HISTORY OF PRESENT ILLNESS
[FreeTextEntry1] : The patient is status post EGD performed on October 16, 2020.  The examination was significant for a 1 cm hiatal hernia.  Biopsy showed evidence of reflux esophagitis at the GE junction.  Villi were normal.  The patient had blood work performed on October 24, 2020 which showed elevated liver tests with an alkaline phosphatase of 136, AST 60, and ALT 82.  On April 26, 2020, all of these labs were normal.  However, previous to this, the patient has had intermittently abnormal transaminases but never an abnormal alkaline phosphatase.  Previous evaluation for multiple causes of chronic liver disease were negative.  The patient feels well denying abdominal pain, heartburn, dysphagia, nausea, vomiting.  She moves her bowels once a day using digital manipulation for many years.  The patient has been on a strict 100% gluten-free diet.\par \par \par Note from 5/12/2020 - The patient was scheduled for a telehealth visit but her Internet was not working and the visit was converted to a telephonic visit.\par \par The patient has celiac disease and reports being on a 100% gluten-free diet.  We reviewed her blood work from April 21, 2020 which was significant for a weakly positive tissue transglutaminase IgG of 6.3 and a vitamin D level of 28.3.  The patient is now on vitamin D3 supplementation.  She reports being strictly gluten-free and is very conscious about contact and cross-contamination.  She does note that she uses a lot of Chapstick, which may not be gluten-free.  She feels well denying abdominal pain, heartburn, dysphasia.  Bowel movements are normal without melena or bright red blood per rectum.  The patient's weight is stable.  The patient has not been admitted to the hospital in the past year and denies any cardiac issues.\par \par \par Note from 12/9/2019 - The patient has celiac disease.  Her blood work from her last visit in August was improved with weakly positive tissue transglutaminase IgG and IgA but normal deaminase gliadin antibodies.  The patient reports that she is on a 100% gluten-free diet.  She denies abdominal pain or bloating.  Bowel movements are normal.\par \par \par Note from 8/1/2019 - We reviewed the blood work from the patient's last visit on June 11, 2019. There is improvement of the celiac labs although the tissue transglutaminase IgA weakly positive as is the tissue transglutaminase IgG and the anti-deamidated gliadin antibody IgG. 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 The patient states that she has been on a 100% gluten free diet. She saw the nutritionist and understands the requirements of the diet. She denies abdominal pain or bloating. She has one solid bowel movement a day without melena or bright blood per rectum. Patient has lost 3 pounds intentionally.\par \par \par Note from 6/11/19 - The patient is a 58-year-old woman previously followed by my partner Dr. Alicia who is referred to me because of a new diagnosis of celiac disease. I reviewed her blood work from January 9, 2019 which revealed a tissue transglutaminase IgA of 93.4 with an elevated endomysial antibody and an ALT of 55. The patient underwent EGD and colonoscopy on May 9, 2019. EGD showed possible scalloping along with a hiatal hernia. Biopsies showed mildly increased intraepithelial lymphocytes in the distal duodenum and duodenal bulb. Colonoscopy was significant for diverticulosis and a hyperplastic polyp.\par \par The patient has been on a gluten-free diet. She denies abdominal pain, bloating, heartburn, dysphagia. She has one solid bowel movement a day without melena or prior blood per rectum. She has lost 5 pounds intentionally. She denies any rashes, joint pains, or dizziness. The patient has a history of osteopenia and had a bone density scan within the past year.\par \par  The patient has not been hospitalized in the past year and denies any cardiac issues.

## 2020-11-02 NOTE — CONSULT LETTER
[FreeTextEntry1] : Dear Dr. Nancy Cardenas,\[facility] \[facility] I had the pleasure of seeing your patient GLENYS EARL in the office today.  My office note is attached. PLEASE READ THE "ASSESSMENT" SECTION OF THE NOTE TO SEE MY IMPRESSION AND PLAN.\par \[facility] Thank you very much for allowing me to participate in the care of your patient.\[facility] \[facility] Sincerely,\[facility] \[facility] Parker Amanda M.D., FAC, [facility] Director, Celiac Program at [facility]  of Medicine\[facility] [employer] at [facility] Practice Director,[facility] - Gastroenterology/Internal Medicine at [place]\[facility] [address]\[place] [address]\[facility] Tel: [phone]\[facility] Email: [email or web address] \[facility] \[facility] The attached note has been created using a voice recognition system (Dragon).  There may be some misspellings and typos.  Please call my office if you have any issues or questions.

## 2020-11-08 LAB
25(OH)D3 SERPL-MCNC: 37.9 NG/ML
AFP-TM SERPL-MCNC: 3.8 NG/ML
ALBUMIN SERPL ELPH-MCNC: 4.5 G/DL
ALP BLD-CCNC: 120 U/L
ALT SERPL-CCNC: 39 U/L
AST SERPL-CCNC: 28 U/L
BILIRUB DIRECT SERPL-MCNC: 0.1 MG/DL
BILIRUB INDIRECT SERPL-MCNC: 0.1 MG/DL
BILIRUB SERPL-MCNC: 0.2 MG/DL
CAROTENE SERPL-MCNC: 64 MCG/DL
ENDOMYSIUM IGA SER QL: NEGATIVE
ENDOMYSIUM IGA TITR SER: NORMAL
FERRITIN SERPL-MCNC: 60 NG/ML
FOLATE SERPL-MCNC: 10.7 NG/ML
GGT SERPL-CCNC: 62 U/L
GLIADIN IGA SER QL: 6.1 UNITS
GLIADIN IGG SER QL: 6 UNITS
GLIADIN PEPTIDE IGA SER-ACNC: NEGATIVE
GLIADIN PEPTIDE IGG SER-ACNC: NEGATIVE
HBV CORE IGG+IGM SER QL: NONREACTIVE
HBV SURFACE AB SER QL: NONREACTIVE
HBV SURFACE AG SER QL: NONREACTIVE
HCV AB SER QL: NONREACTIVE
HCV S/CO RATIO: 0.13 S/CO
IGA SER QL IEP: 295 MG/DL
IRON SATN MFR SERPL: 24 %
IRON SERPL-MCNC: 80 UG/DL
MAGNESIUM SERPL-MCNC: 2.2 MG/DL
PROT SERPL-MCNC: 7 G/DL
TIBC SERPL-MCNC: 339 UG/DL
TTG IGA SER IA-ACNC: 2.8 U/ML
TTG IGA SER-ACNC: NEGATIVE
TTG IGG SER IA-ACNC: 7.8 U/ML
TTG IGG SER IA-ACNC: ABNORMAL
UIBC SERPL-MCNC: 258 UG/DL
VIT B12 SERPL-MCNC: 558 PG/ML
VIT B6 SERPL-MCNC: 17.4 UG/L
ZINC SERPL-MCNC: 91 UG/DL

## 2020-11-09 ENCOUNTER — NON-APPOINTMENT (OUTPATIENT)
Age: 60
End: 2020-11-09

## 2020-11-10 LAB — VIT A SERPL-MCNC: 54.2 UG/DL

## 2020-11-11 LAB — MENADIONE SERPL-MCNC: 0.5 NG/ML

## 2020-11-16 ENCOUNTER — NON-APPOINTMENT (OUTPATIENT)
Age: 60
End: 2020-11-16

## 2020-11-23 ENCOUNTER — RX RENEWAL (OUTPATIENT)
Age: 60
End: 2020-11-23

## 2021-01-14 ENCOUNTER — APPOINTMENT (OUTPATIENT)
Dept: OBGYN | Facility: CLINIC | Age: 61
End: 2021-01-14
Payer: COMMERCIAL

## 2021-01-14 VITALS
DIASTOLIC BLOOD PRESSURE: 60 MMHG | BODY MASS INDEX: 21.66 KG/M2 | HEIGHT: 65 IN | WEIGHT: 130 LBS | SYSTOLIC BLOOD PRESSURE: 102 MMHG

## 2021-01-14 DIAGNOSIS — N94.10 UNSPECIFIED DYSPAREUNIA: ICD-10-CM

## 2021-01-14 PROCEDURE — 99072 ADDL SUPL MATRL&STAF TM PHE: CPT

## 2021-01-14 PROCEDURE — 99396 PREV VISIT EST AGE 40-64: CPT

## 2021-01-14 NOTE — HISTORY OF PRESENT ILLNESS
[FreeTextEntry1] : 59 yo G0 with LMP 50 for new gyn visit to me.\par With partner for 20 years. Not sexually active due to pain. Using Yuvafem regularly.\par \par Gyn: \par 2011 Dr Rodgers and Bedolla -supracervical hyst and sacral colpopexy, mesh and rectopexy for difficulty\par evacuating bowel.\par \par Med: Recently dx with Celiac disease. Endoscopy recently. Colonoscopy 2019. Will repeat in 2022.\par Most concerned over elevated GGT which Dr Amanda is following. \par \par \par . [Mammogramdate] : 11/13/18 [TextBox_19] : BIRAD 1 [PapSmeardate] : 11/2018 [ColonoscopyDate] : 2018 [TextBox_43] : 5 yr F/U

## 2021-01-14 NOTE — DISCUSSION/SUMMARY
[FreeTextEntry1] : Health Maintenance:\par -Pap with HPV\par -Mammo Rx\par -TBSE\par -colonoscopy guidelines reviewed with patient. She has \par Vit D 1000 IUs daily, calcium of 1100mg daily thru diet of dark green leafy vegetables, low-fat milk products and exercise TIW.\par \par Atrophy:\par Estradiol 10mcg BIW\par discussed that limited absorption reported, so not contraindication with elevated GGt\par \par Low Libido:\par -discussed testosterone risks and benefits\par -Estratest is only FDA approved\par -could go to bio-identical practitioner\par -[facility] recommendation.\par

## 2021-01-14 NOTE — PHYSICAL EXAM
[Vulvar Atrophy] : vulvar atrophy [Atrophy] : atrophy [Absent] : absent [Appropriately responsive] : appropriately responsive [Alert] : alert [No Acute Distress] : no acute distress [No Lymphadenopathy] : no lymphadenopathy [No Murmurs] : no murmurs [Soft] : soft [Non-tender] : non-tender [Non-distended] : non-distended [No HSM] : No HSM [No Lesions] : no lesions [No Mass] : no mass [Oriented x3] : oriented x3 [Examination Of The Breasts] : a normal appearance [Normal] : normal [No Masses] : no breast masses were palpable [FreeTextEntry5] : Green discharge and bleeding.

## 2021-01-16 LAB
CANDIDA VAG CYTO: NOT DETECTED
G VAGINALIS+PREV SP MTYP VAG QL MICRO: NOT DETECTED
HPV HIGH+LOW RISK DNA PNL CVX: NOT DETECTED
T VAGINALIS VAG QL WET PREP: NOT DETECTED

## 2021-02-08 ENCOUNTER — NON-APPOINTMENT (OUTPATIENT)
Age: 61
End: 2021-02-08

## 2021-03-04 ENCOUNTER — LABORATORY RESULT (OUTPATIENT)
Age: 61
End: 2021-03-04

## 2021-03-04 ENCOUNTER — APPOINTMENT (OUTPATIENT)
Dept: GASTROENTEROLOGY | Facility: CLINIC | Age: 61
End: 2021-03-04
Payer: COMMERCIAL

## 2021-03-04 VITALS
HEART RATE: 71 BPM | TEMPERATURE: 97.5 F | OXYGEN SATURATION: 99 % | DIASTOLIC BLOOD PRESSURE: 89 MMHG | WEIGHT: 133 LBS | BODY MASS INDEX: 22.16 KG/M2 | HEIGHT: 65 IN | SYSTOLIC BLOOD PRESSURE: 135 MMHG

## 2021-03-04 PROCEDURE — 99072 ADDL SUPL MATRL&STAF TM PHE: CPT

## 2021-03-04 PROCEDURE — 36415 COLL VENOUS BLD VENIPUNCTURE: CPT

## 2021-03-04 PROCEDURE — 99214 OFFICE O/P EST MOD 30 MIN: CPT | Mod: 25

## 2021-03-04 NOTE — CONSULT LETTER
[FreeTextEntry1] : Dear Dr. Nancy Cardenas,\[facility] \[facility] I had the pleasure of seeing your patient GLENYS EARL in the office today.  My office note is attached. PLEASE READ THE "ASSESSMENT" SECTION OF THE NOTE TO SEE MY IMPRESSION AND PLAN.\par \[facility] Thank you very much for allowing me to participate in the care of your patient.\[facility] \[facility] Sincerely,\[facility] \[facility] Parker Amanda M.D., FAC, [facility] Director, Celiac Program at [facility]  of Medicine\[facility] [employer] at [facility] Practice Director,[facility] - Gastroenterology/Internal Medicine at [place]\[facility] [address]\[place] [address]\[facility] Tel: [phone]\[facility] Email: [email or web address] \[facility] \[facility] The attached note has been created using a voice recognition system (Dragon).  There may be some misspellings and typos.  Please call my office if you have any issues or questions.

## 2021-03-04 NOTE — HISTORY OF PRESENT ILLNESS
[FreeTextEntry1] : The patient has celiac disease.  We reviewed the evaluations done since the patient's last visit on November 2, 2020.  Blood work showed normal celiac labs except for a weakly positive tissue transglutaminase IgG.  AST and ALT have returned to normal at 28 and 39 respectively with an alkaline phosphatase of 120 although GGTP remains elevated at 62.  An abdominal ultrasound performed on November 11, 2020 was normal.  The patient remains on a strict gluten-free diet.  She denies abdominal pain, bloating, nausea, vomiting, heartburn, dysphagia.  She has constipation and manually disimpacted herself daily to move her bowels.  She denies melena or bright red blood per rectum.  She has only rare alcohol intake.\par \par \par Note from 11/2/2020 - The patient is status post EGD performed on October 16, 2020.  The examination was significant for a 1 cm hiatal hernia.  Biopsy showed evidence of reflux esophagitis at the GE junction.  Villi were normal.  The patient had blood work performed on October 24, 2020 which showed elevated liver tests with an alkaline phosphatase of 136, AST 60, and ALT 82.  On April 26, 2020, all of these labs were normal.  However, previous to this, the patient has had intermittently abnormal transaminases but never an abnormal alkaline phosphatase.  Previous evaluation for multiple causes of chronic liver disease were negative.  The patient feels well denying abdominal pain, heartburn, dysphagia, nausea, vomiting.  She moves her bowels once a day using digital manipulation for many years.  The patient has been on a strict 100% gluten-free diet.\par \par \par Note from 5/12/2020 - The patient was scheduled for a telehealth visit but her Internet was not working and the visit was converted to a telephonic visit.\par \par The patient has celiac disease and reports being on a 100% gluten-free diet.  We reviewed her blood work from April 21, 2020 which was significant for a weakly positive tissue transglutaminase IgG of 6.3 and a vitamin D level of 28.3.  The patient is now on vitamin D3 supplementation.  She reports being strictly gluten-free and is very conscious about contact and cross-contamination.  She does note that she uses a lot of Chapstick, which may not be gluten-free.  She feels well denying abdominal pain, heartburn, dysphasia.  Bowel movements are normal without melena or bright red blood per rectum.  The patient's weight is stable.  The patient has not been admitted to the hospital in the past year and denies any cardiac issues.\par \par \par Note from 12/9/2019 - The patient has celiac disease.  Her blood work from her last visit in August was improved with weakly positive tissue transglutaminase IgG and IgA but normal deaminase gliadin antibodies.  The patient reports that she is on a 100% gluten-free diet.  She denies abdominal pain or bloating.  Bowel movements are normal.\par \par \par Note from 8/1/2019 - We reviewed the blood work from the patient's last visit on June 11, 2019. There is improvement of the celiac labs although the tissue transglutaminase IgA weakly positive as is the tissue transglutaminase IgG and the anti-deamidated gliadin antibody IgG. 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 The patient states that she has been on a 100% gluten free diet. She saw the nutritionist and understands the requirements of the diet. She denies abdominal pain or bloating. She has one solid bowel movement a day without melena or bright blood per rectum. Patient has lost 3 pounds intentionally.\par \par \par Note from 6/11/19 - The patient is a 58-year-old woman previously followed by my partner Dr. Alicia who is referred to me because of a new diagnosis of celiac disease. I reviewed her blood work from January 9, 2019 which revealed a tissue transglutaminase IgA of 93.4 with an elevated endomysial antibody and an ALT of 55. The patient underwent EGD and colonoscopy on May 9, 2019. EGD showed possible scalloping along with a hiatal hernia. Biopsies showed mildly increased intraepithelial lymphocytes in the distal duodenum and duodenal bulb. Colonoscopy was significant for diverticulosis and a hyperplastic polyp.\par \par The patient has been on a gluten-free diet. She denies abdominal pain, bloating, heartburn, dysphagia. She has one solid bowel movement a day without melena or prior blood per rectum. She has lost 5 pounds intentionally. She denies any rashes, joint pains, or dizziness. The patient has a history of osteopenia and had a bone density scan within the past year.\par \par  The patient has not been hospitalized in the past year and denies any cardiac issues.

## 2021-03-04 NOTE — ASSESSMENT
[FreeTextEntry1] : Patient with celiac disease doing well on a gluten-free diet.  She has had intermittently mildly elevated transaminases and has a chronically elevated GGTP.  She has constipation requiring manual disimpaction.\par \par Blood work was sent for LFTs, PT, alpha-1 antitrypsin, alpha-fetoprotein, VENESSA, ANCA, ceruloplasmin, iron studies, GGTP, celiac markers, hepatitis A., B., C. serologies, lipid profile, AMA, anti-smooth muscle antibody, anti-LKM antibody, anti-soluble liver antigen antibody, CBC, chem-pack, TSH, B12, folate, vitamin A, vitamin D, vitamin K, magnesium, carotene, vitamin B6, zinc.\par \par Patient was advised to use MiraLAX 17 g in 8 ounces of water once a day.\par \par Patient is due for colonoscopy in May 2024.\par \par \par Plan from 11/2/2020 - Patient with celiac disease who is elevated AST, ALT, and alkaline phosphatase.  EGD revealed a 1 cm hiatal hernia with biopsy evidence of reflux esophagitis.\par \par Bloodwork was sent for LFTs, AFP, hepatitis B and C serologies, celiac markers, iron studies, B12, folate, vitamin A, vitamin D, vitamin K, magnesium, carotene, vitamin B6, zinc.\par \par Patient was sent for an abdominal ultrasound.\par \par Patient is due for colonoscopy in May 2024.\par \par \par Plan from 5/12/2020 - Patient with celiac disease who reports being on a 100% gluten-free diet.  Blood work was significant for a weakly positive tissue transglutaminase IgG.  She also has mildly decreased vitamin D.\par \par Once the COVID-19 pandemic allows, an EGD will be scheduled. The risks, benefits, alternatives, and limitations of the procedure were explained.\par \par I discussed with the patient the possibility that gluten is inadvertently entering her body.  We discussed the Chapstick and the patient will look for a gluten-free alternative.  We also discussed receiving the dietitian, although the patient will wait to see if the EGD shows findings of ongoing celiac damage.\par \par Patient is due for colonoscopy in May 2024.\par \par \par Plan from 12/9/2019 - Patient with celiac disease diagnosed in May who has been on a 100% gluten-free diet.  We have not yet seen full normalization of her serologies but they are improving.\par \par Bloodwork was sent for CBC, chem-pack, TSH, celiac markers, iron studies, B12, folate, vitamin A, D, K, and magnesium.\par \par Patient will return to see me in 4 months.  We will plan on repeating an EGD in May 2020.  Patient is due for colonoscopy in May 2024.\par \par \par Plan from 8/1/2019 - Patient with celiac disease whose labs are improving but not yet normalized. She appears to be 100% gluten free diet and is being followed by a nutritionist.\par \par I had a long discussion with the patient regarding celiac disease, the gluten-free diet, and the concepts of contact and cross contamination.\par \par Bloodwork was sent for CBC, chem-pack, TSH, celiac markers, iron studies, B12, folate, vitamin D, and magnesium.\par \par Patient will return to see me in 4 months.\par \par We will plan on repeating EGD in May 2020 and colonoscopy in May 2024.\par \par \par Plan from 6/11/19 - Patient with celiac serologies but a markedly abnormal suggestive of celiac disease. Endoscopic biopsies show mildly increased intraepithelial lymphocytes, a finding that can be seen in celiac disease but is not diagnostic of that. With the combination of the blood tests and biopsy results, assuming that the patient's celiac genetic testing is positive, this is consistent with a diagnosis of celiac disease. The patient has an elevated ALT. She had a recent colonoscopy revealing diverticulosis.\par \par I had a long discussion with the patient regarding celiac disease, the gluten-free diet, the concepts of contact and cross contamination, and the potential complications of celiac disease. We also discussed the need for ongoing followup.\par \par Information was given to the patient regarding celiac disease and a gluten free diet. The patient was also given a list of local restaurants that are "celiac friendly".\par \par Blood work was sent for LFTs, PT, alpha-1 antitrypsin, alpha-fetoprotein, VENESSA, ANCA, ceruloplasmin, iron studies, GGTP, celiac markers, hepatitis A., B., C. serologies, lipid profile, AMA, anti-smooth muscle antibody, anti-LKM antibody, anti-soluble liver antigen antibody, CBC, chem-pack, TSH, B12, folate, vitamin D, and magnesium and celiac HLA testing.\par \par The patient was given the name of a nutitionist, Wily Reeves, to see.\par \par Information was given to the patient regarding diverticulosis and a high-fiber diet.\par \par We will repeat a colonoscopy in 5-10 years.\par \par Patient returned to see me in 2 months.

## 2021-03-08 ENCOUNTER — NON-APPOINTMENT (OUTPATIENT)
Age: 61
End: 2021-03-08

## 2021-03-12 LAB
25(OH)D3 SERPL-MCNC: 31.6 NG/ML
A1AT SERPL-MCNC: 142 MG/DL
AFP-TM SERPL-MCNC: 3.4 NG/ML
ALBUMIN SERPL ELPH-MCNC: 4.7 G/DL
ALP BLD-CCNC: 120 U/L
ALT SERPL-CCNC: 32 U/L
ANA SER IF-ACNC: NEGATIVE
ANION GAP SERPL CALC-SCNC: 9 MMOL/L
AST SERPL-CCNC: 26 U/L
BASOPHILS # BLD AUTO: 0.05 K/UL
BASOPHILS NFR BLD AUTO: 0.8 %
BETA CAROTENE: 81 UG/DL
BILIRUB SERPL-MCNC: 0.2 MG/DL
BUN SERPL-MCNC: 17 MG/DL
CALCIUM SERPL-MCNC: 9.4 MG/DL
CERULOPLASMIN SERPL-MCNC: 26 MG/DL
CHLORIDE SERPL-SCNC: 104 MMOL/L
CHOLEST SERPL-MCNC: 159 MG/DL
CO2 SERPL-SCNC: 27 MMOL/L
CREAT SERPL-MCNC: 0.86 MG/DL
ENDOMYSIUM IGA SER QL: NEGATIVE
ENDOMYSIUM IGA TITR SER: NORMAL
EOSINOPHIL # BLD AUTO: 0.22 K/UL
EOSINOPHIL NFR BLD AUTO: 3.5 %
FERRITIN SERPL-MCNC: 62 NG/ML
FOLATE SERPL-MCNC: 13.1 NG/ML
GGT SERPL-CCNC: 46 U/L
GLIADIN IGA SER QL: 8.8 UNITS
GLIADIN IGG SER QL: 6.8 UNITS
GLIADIN PEPTIDE IGA SER-ACNC: NEGATIVE
GLIADIN PEPTIDE IGG SER-ACNC: NEGATIVE
GLUCOSE SERPL-MCNC: 88 MG/DL
HBV CORE IGG+IGM SER QL: NONREACTIVE
HBV SURFACE AB SER QL: NONREACTIVE
HBV SURFACE AG SER QL: NONREACTIVE
HCT VFR BLD CALC: 41 %
HCV AB SER QL: NONREACTIVE
HCV S/CO RATIO: 0.13 S/CO
HDLC SERPL-MCNC: 56 MG/DL
HEPATITIS A IGG ANTIBODY: NONREACTIVE
HGB BLD-MCNC: 13.6 G/DL
IGA SER QL IEP: 285 MG/DL
IGG SER QL IEP: 1188 MG/DL
IMM GRANULOCYTES NFR BLD AUTO: 0.2 %
INR PPP: 1 RATIO
IRON SATN MFR SERPL: 27 %
IRON SERPL-MCNC: 92 UG/DL
LDLC SERPL CALC-MCNC: 90 MG/DL
LKM AB SER QL IF: <20.1 UNITS
LYMPHOCYTES # BLD AUTO: 1.93 K/UL
LYMPHOCYTES NFR BLD AUTO: 30.6 %
MAGNESIUM SERPL-MCNC: 2.2 MG/DL
MAN DIFF?: NORMAL
MCHC RBC-ENTMCNC: 31.3 PG
MCHC RBC-ENTMCNC: 33.2 GM/DL
MCV RBC AUTO: 94.5 FL
MITOCHONDRIA AB SER IF-ACNC: NORMAL
MONOCYTES # BLD AUTO: 0.5 K/UL
MONOCYTES NFR BLD AUTO: 7.9 %
MPO AB + PR3 PNL SER: NORMAL
NEUTROPHILS # BLD AUTO: 3.59 K/UL
NEUTROPHILS NFR BLD AUTO: 57 %
NONHDLC SERPL-MCNC: 103 MG/DL
PLATELET # BLD AUTO: 231 K/UL
POTASSIUM SERPL-SCNC: 4.2 MMOL/L
PROT SERPL-MCNC: 7.4 G/DL
PT BLD: 11.8 SEC
RBC # BLD: 4.34 M/UL
RBC # FLD: 11.8 %
SMOOTH MUSCLE AB SER QL IF: NORMAL
SODIUM SERPL-SCNC: 141 MMOL/L
SOLUBLE LIVER IGG SER IA-ACNC: 2.7
TIBC SERPL-MCNC: 343 UG/DL
TRIGL SERPL-MCNC: 63 MG/DL
TSH SERPL-ACNC: 1.64 UIU/ML
TTG IGA SER IA-ACNC: 2.8 U/ML
TTG IGA SER-ACNC: NEGATIVE
TTG IGG SER IA-ACNC: 10.1 U/ML
TTG IGG SER IA-ACNC: POSITIVE
UIBC SERPL-MCNC: 251 UG/DL
VIT A SERPL-MCNC: 51.2 UG/DL
VIT B12 SERPL-MCNC: 654 PG/ML
VIT B6 SERPL-MCNC: 23.6 UG/L
WBC # FLD AUTO: 6.3 K/UL
ZINC SERPL-MCNC: 99 UG/DL

## 2021-03-15 ENCOUNTER — NON-APPOINTMENT (OUTPATIENT)
Age: 61
End: 2021-03-15

## 2021-10-06 DIAGNOSIS — R74.8 ABNORMAL LEVELS OF OTHER SERUM ENZYMES: ICD-10-CM

## 2022-02-18 ENCOUNTER — APPOINTMENT (OUTPATIENT)
Dept: OBGYN | Facility: CLINIC | Age: 62
End: 2022-02-18
Payer: COMMERCIAL

## 2022-02-18 VITALS
HEIGHT: 65 IN | BODY MASS INDEX: 22.49 KG/M2 | WEIGHT: 135 LBS | SYSTOLIC BLOOD PRESSURE: 98 MMHG | DIASTOLIC BLOOD PRESSURE: 60 MMHG

## 2022-02-18 PROCEDURE — 99396 PREV VISIT EST AGE 40-64: CPT

## 2022-02-18 NOTE — HISTORY OF PRESENT ILLNESS
[FreeTextEntry1] : 60 yo G0 with LMP 2005 for annual\par \par Prior vaginal PT for frequent UTIs and inability to hve intercourse.\par Using estradiol tablets with success.\par \par Pap all normal\par \par No PMB.  No oral HRT\par \par Celiac known. She has elevated liver enzymes in past. Pt now totally gluten free for resolution of changes.\par \par \par  [TextBox_4] : 62 yo G0  since 2005 here for annual exam [Mammogramdate] : 2021 [TextBox_19] : SURYA neg [PapSmeardate] : 1/14/21 [ColonoscopyDate] : 10/2020

## 2022-02-18 NOTE — PHYSICAL EXAM
[Appropriately responsive] : appropriately responsive [No Acute Distress] : no acute distress [Alert] : alert [No Lymphadenopathy] : no lymphadenopathy [Soft] : soft [Non-tender] : non-tender [Non-distended] : non-distended [No HSM] : No HSM [No Lesions] : no lesions [No Mass] : no mass [Oriented x3] : oriented x3 [Examination Of The Breasts] : a normal appearance [No Masses] : no breast masses were palpable [Labia Minora] : normal [Labia Majora] : normal [Normal] : normal [Uterine Adnexae] : normal [FreeTextEntry9] : Nelda neg

## 2022-02-18 NOTE — DISCUSSION/SUMMARY
[FreeTextEntry1] : Health Maintenance:\par -Pap with HPV\par -Mammo Rx\par -TBSE\par -colonoscopy guidelines reviewed with patient\par -Achieve Vit D levels of 30-40, intake of 1100 mg daily calcium mostly thru dark green\par leafy greens and milk products, exercise 30 minutes TIW\par \par Supracervial hyst:\par Mesh place at time to improve situation\par pt had inabilty to evacuate bowels without manual extraction\par This problem has existed since 20s.

## 2022-02-21 LAB — HPV HIGH+LOW RISK DNA PNL CVX: NOT DETECTED

## 2022-03-16 ENCOUNTER — APPOINTMENT (OUTPATIENT)
Dept: GASTROENTEROLOGY | Facility: CLINIC | Age: 62
End: 2022-03-16
Payer: COMMERCIAL

## 2022-03-16 VITALS
HEART RATE: 80 BPM | BODY MASS INDEX: 21.66 KG/M2 | HEIGHT: 65 IN | TEMPERATURE: 97.7 F | OXYGEN SATURATION: 99 % | DIASTOLIC BLOOD PRESSURE: 80 MMHG | WEIGHT: 130 LBS | SYSTOLIC BLOOD PRESSURE: 132 MMHG

## 2022-03-16 PROCEDURE — 36415 COLL VENOUS BLD VENIPUNCTURE: CPT

## 2022-03-16 PROCEDURE — 99214 OFFICE O/P EST MOD 30 MIN: CPT | Mod: 25

## 2022-03-20 NOTE — ASSESSMENT
[FreeTextEntry1] : Patient with celiac disease on a strict gluten-free diet.  She has chronic constipation for which she uses digital manipulation.\par \par Bloodwork was sent for CBC, chem-pack, TSH, celiac markers, iron studies, B12, folate, vitamin A, vitamin D, vitamin K, magnesium, carotene, vitamin B6, zinc, copper, vitamin B1.\par \par In an effort to avoid digital manipulation, the patient was started on Linzess 290 mcg a day.\par \par Patient is due for colonoscopy in May 2024.\par \par Patient will return to see me in 4 to 6 weeks to assess her response to the Linzess.\par \par \par Plan from 3/4/2021 - Patient with celiac disease doing well on a gluten-free diet.  She has had intermittently mildly elevated transaminases and has a chronically elevated GGTP.  She has constipation requiring manual disimpaction.\par \[facility] Blood work was sent for LFTs, PT, alpha-1 antitrypsin, alpha-fetoprotein, VENESSA, ANCA, ceruloplasmin, iron studies, GGTP, celiac markers, hepatitis A., B., C. serologies, lipid profile, AMA, anti-smooth muscle antibody, anti-LKM antibody, anti-soluble liver antigen antibody, CBC, chem-pack, TSH, B12, folate, vitamin A, vitamin D, vitamin K, magnesium, carotene, vitamin B6, zinc.\par \par Patient was advised to use MiraLAX 17 g in 8 ounces of water once a day.\[facility] \[facility] Patient is due for colonoscopy in May 2024.\[facility] \[facility] \par Plan from 11/2/2020 - Patient with celiac disease who is elevated AST, ALT, and alkaline phosphatase.  EGD revealed a 1 cm hiatal hernia with biopsy evidence of reflux esophagitis.\par \[facility] Bloodwork was sent for LFTs, AFP, hepatitis B and C serologies, celiac markers, iron studies, B12, folate, vitamin A, vitamin D, vitamin K, magnesium, carotene, vitamin B6, zinc.\[facility] \par Patient was sent for an abdominal ultrasound.\par \par Patient is due for colonoscopy in May 2024.\[facility] \par \par Plan from 5/12/2020 - Patient with celiac disease who reports being on a 100% gluten-free diet.  Blood work was significant for a weakly positive tissue transglutaminase IgG.  She also has mildly decreased vitamin D.\par \par Once the COVID-19 pandemic allows, an EGD will be scheduled. The risks, benefits, alternatives, and limitations of the procedure were explained.\par \par I discussed with the patient the possibility that gluten is inadvertently entering her body.  We discussed the Chapstick and the patient will look for a gluten-free alternative.  We also discussed receiving the dietitian, although the patient will wait to see if the EGD shows findings of ongoing celiac damage.\par \par Patient is due for colonoscopy in May 2024.\par \par \par Plan from 12/9/2019 - Patient with celiac disease diagnosed in May who has been on a 100% gluten-free diet.  We have not yet seen full normalization of her serologies but they are improving.\par \par Bloodwork was sent for CBC, chem-pack, TSH, celiac markers, iron studies, B12, folate, vitamin A, D, K, and magnesium.\par \par Patient will return to see me in 4 months.  We will plan on repeating an EGD in May 2020.  Patient is due for colonoscopy in May 2024.\par \par \par Plan from 8/1/2019 - Patient with celiac disease whose labs are improving but not yet normalized. She appears to be 100% gluten free diet and is being followed by a nutritionist.\par \par I had a long discussion with the patient regarding celiac disease, the gluten-free diet, and the concepts of contact and cross contamination.\par \par Bloodwork was sent for CBC, chem-pack, TSH, celiac markers, iron studies, B12, folate, vitamin D, and magnesium.\par \par Patient will return to see me in 4 months.\par \par We will plan on repeating EGD in May 2020 and colonoscopy in May 2024.\par \par \par Plan from 6/11/19 - Patient with celiac serologies but a markedly abnormal suggestive of celiac disease. Endoscopic biopsies show mildly increased intraepithelial lymphocytes, a finding that can be seen in celiac disease but is not diagnostic of that. With the combination of the blood tests and biopsy results, assuming that the patient's celiac genetic testing is positive, this is consistent with a diagnosis of celiac disease. The patient has an elevated ALT. She had a recent colonoscopy revealing diverticulosis.\par \par I had a long discussion with the patient regarding celiac disease, the gluten-free diet, the concepts of contact and cross contamination, and the potential complications of celiac disease. We also discussed the need for ongoing followup.\par \par Information was given to the patient regarding celiac disease and a gluten free diet. The patient was also given a list of local restaurants that are "celiac friendly".\par \par Blood work was sent for LFTs, PT, alpha-1 antitrypsin, alpha-fetoprotein, VENESSA, ANCA, ceruloplasmin, iron studies, GGTP, celiac markers, hepatitis A., B., C. serologies, lipid profile, AMA, anti-smooth muscle antibody, anti-LKM antibody, anti-soluble liver antigen antibody, CBC, chem-pack, TSH, B12, folate, vitamin D, and magnesium and celiac HLA testing.\par \par The patient was given the name of a nutitionist, Wily Reeves, to see.\par \par Information was given to the patient regarding diverticulosis and a high-fiber diet.\par \par We will repeat a colonoscopy in 5-10 years.\par \par Patient returned to see me in 2 months.

## 2022-03-20 NOTE — HISTORY OF PRESENT ILLNESS
[FreeTextEntry1] : The patient has celiac disease.  She is seen for the first time in a year.  Blood work from her last visit in March 2021 showed a tissue transglutaminase IgG of 10.1 with otherwise normal celiac labs.  GGTP was mildly elevated at 46.  The patient has been on a strict gluten-free diet although she did have 1 inadvertent gluten exposure.  She denies abdominal pain or bloating.  She has chronic constipation for which she has been using digital manipulation for 40 years.  She takes MiraLAX about 3-4 times a week.  With the digital manipulation, she moves her bowels daily.  She denies melena or bright red blood per rectum.  She has very rare heartburn and denies dysphagia.  Patient's weight is stable.  The patient has not been admitted to the hospital in the past year and denies any cardiac issues.\par \par \par Note from 3/4/2021 - The patient has celiac disease.  We reviewed the evaluations done since the patient's last visit on November 2, 2020.  Blood work showed normal celiac labs except for a weakly positive tissue transglutaminase IgG.  AST and ALT have returned to normal at 28 and 39 respectively with an alkaline phosphatase of 120 although GGTP remains elevated at 62.  An abdominal ultrasound performed on November 11, 2020 was normal.  The patient remains on a strict gluten-free diet.  She denies abdominal pain, bloating, nausea, vomiting, heartburn, dysphagia.  She has constipation and manually disimpacted herself daily to move her bowels.  She denies melena or bright red blood per rectum.  She has only rare alcohol intake.\par \par \par Note from 11/2/2020 - The patient is status post EGD performed on October 16, 2020.  The examination was significant for a 1 cm hiatal hernia.  Biopsy showed evidence of reflux esophagitis at the GE junction.  Villi were normal.  The patient had blood work performed on October 24, 2020 which showed elevated liver tests with an alkaline phosphatase of 136, AST 60, and ALT 82.  On April 26, 2020, all of these labs were normal.  However, previous to this, the patient has had intermittently abnormal transaminases but never an abnormal alkaline phosphatase.  Previous evaluation for multiple causes of chronic liver disease were negative.  The patient feels well denying abdominal pain, heartburn, dysphagia, nausea, vomiting.  She moves her bowels once a day using digital manipulation for many years.  The patient has been on a strict 100% gluten-free diet.\par \par \par Note from 5/12/2020 - The patient was scheduled for a telehealth visit but her Internet was not working and the visit was converted to a telephonic visit.\par \par The patient has celiac disease and reports being on a 100% gluten-free diet.  We reviewed her blood work from April 21, 2020 which was significant for a weakly positive tissue transglutaminase IgG of 6.3 and a vitamin D level of 28.3.  The patient is now on vitamin D3 supplementation.  She reports being strictly gluten-free and is very conscious about contact and cross-contamination.  She does note that she uses a lot of Chapstick, which may not be gluten-free.  She feels well denying abdominal pain, heartburn, dysphasia.  Bowel movements are normal without melena or bright red blood per rectum.  The patient's weight is stable.  The patient has not been admitted to the hospital in the past year and denies any cardiac issues.\par \par \par Note from 12/9/2019 - The patient has celiac disease.  Her blood work from her last visit in August was improved with weakly positive tissue transglutaminase IgG and IgA but normal deaminase gliadin antibodies.  The patient reports that she is on a 100% gluten-free diet.  She denies abdominal pain or bloating.  Bowel movements are normal.\par \par \par Note from 8/1/2019 - We reviewed the blood work from the patient's last visit on June 11, 2019. There is improvement of the celiac labs although the tissue transglutaminase IgA weakly positive as is the tissue transglutaminase IgG and the anti-deamidated gliadin antibody IgG. 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 The patient states that she has been on a 100% gluten free diet. She saw the nutritionist and understands the requirements of the diet. She denies abdominal pain or bloating. She has one solid bowel movement a day without melena or bright blood per rectum. Patient has lost 3 pounds intentionally.\par \par \par Note from 6/11/19 - The patient is a 58-year-old woman previously followed by my partner Dr. Alicia who is referred to me because of a new diagnosis of celiac disease. I reviewed her blood work from January 9, 2019 which revealed a tissue transglutaminase IgA of 93.4 with an elevated endomysial antibody and an ALT of 55. The patient underwent EGD and colonoscopy on May 9, 2019. EGD showed possible scalloping along with a hiatal hernia. Biopsies showed mildly increased intraepithelial lymphocytes in the distal duodenum and duodenal bulb. Colonoscopy was significant for diverticulosis and a hyperplastic polyp.\par \par The patient has been on a gluten-free diet. She denies abdominal pain, bloating, heartburn, dysphagia. She has one solid bowel movement a day without melena or prior blood per rectum. She has lost 5 pounds intentionally. She denies any rashes, joint pains, or dizziness. The patient has a history of osteopenia and had a bone density scan within the past year.\par \par  The patient has not been hospitalized in the past year and denies any cardiac issues.

## 2022-03-20 NOTE — CONSULT LETTER
[FreeTextEntry1] : Dear Dr. Nancy Cardenas,\[facility] \[facility] I had the pleasure of seeing your patient GLENYS EARL in the office today.  My office note is attached. PLEASE READ THE "ASSESSMENT" SECTION OF THE NOTE TO SEE MY IMPRESSION AND PLAN.\par \[facility] Thank you very much for allowing me to participate in the care of your patient.\[facility] \[facility] Sincerely,\[facility] \[facility] Parker Amanda M.D., FAC, [facility] Director, Celiac Program at [facility]  of Medicine\[facility] [employer] at [facility] Practice Director,[facility] - Gastroenterology/Internal Medicine at [place]\[facility] [address]\[place] [address]\[facility] Tel: [phone]\[facility] Email: [email or web address] \[facility] \[facility] The attached note has been created using a voice recognition system (Dragon).  There may be some misspellings and typos.  Please call my office if you have any issues or questions.

## 2022-04-02 LAB
25(OH)D3 SERPL-MCNC: 28 NG/ML
ALBUMIN SERPL ELPH-MCNC: 4.4 G/DL
ALP BLD-CCNC: 101 U/L
ALT SERPL-CCNC: 36 U/L
ANION GAP SERPL CALC-SCNC: 12 MMOL/L
AST SERPL-CCNC: 26 U/L
BASOPHILS # BLD AUTO: 0.05 K/UL
BASOPHILS NFR BLD AUTO: 0.8 %
BETA CAROTENE: 54 UG/DL
BILIRUB SERPL-MCNC: 0.3 MG/DL
BUN SERPL-MCNC: 15 MG/DL
CALCIUM SERPL-MCNC: 9.6 MG/DL
CHLORIDE SERPL-SCNC: 105 MMOL/L
CO2 SERPL-SCNC: 24 MMOL/L
COPPER SERPL-MCNC: 104 UG/DL
CREAT SERPL-MCNC: 0.84 MG/DL
EGFR: 79 ML/MIN/1.73M2
ENDOMYSIUM IGA SER QL: NEGATIVE
ENDOMYSIUM IGA TITR SER: NORMAL
EOSINOPHIL # BLD AUTO: 0.18 K/UL
EOSINOPHIL NFR BLD AUTO: 2.9 %
FERRITIN SERPL-MCNC: 24 NG/ML
FOLATE SERPL-MCNC: 13.6 NG/ML
GGT SERPL-CCNC: 45 U/L
GLIADIN IGA SER QL: 9.8 UNITS
GLIADIN IGG SER QL: 6.8 UNITS
GLIADIN PEPTIDE IGA SER-ACNC: NEGATIVE
GLIADIN PEPTIDE IGG SER-ACNC: NEGATIVE
GLUCOSE SERPL-MCNC: 87 MG/DL
HCT VFR BLD CALC: 37.1 %
HGB BLD-MCNC: 11.9 G/DL
IGA SER QL IEP: 269 MG/DL
IMM GRANULOCYTES NFR BLD AUTO: 0.2 %
IRON SATN MFR SERPL: 19 %
IRON SERPL-MCNC: 71 UG/DL
LYMPHOCYTES # BLD AUTO: 1.83 K/UL
LYMPHOCYTES NFR BLD AUTO: 29.4 %
MAGNESIUM SERPL-MCNC: 2 MG/DL
MAN DIFF?: NORMAL
MCHC RBC-ENTMCNC: 30.9 PG
MCHC RBC-ENTMCNC: 32.1 GM/DL
MCV RBC AUTO: 96.4 FL
MENADIONE SERPL-MCNC: 0.75 NG/ML
MONOCYTES # BLD AUTO: 0.52 K/UL
MONOCYTES NFR BLD AUTO: 8.3 %
NEUTROPHILS # BLD AUTO: 3.64 K/UL
NEUTROPHILS NFR BLD AUTO: 58.4 %
PLATELET # BLD AUTO: 261 K/UL
POTASSIUM SERPL-SCNC: 4 MMOL/L
PROT SERPL-MCNC: 6.9 G/DL
RBC # BLD: 3.85 M/UL
RBC # FLD: 12.2 %
SODIUM SERPL-SCNC: 141 MMOL/L
TIBC SERPL-MCNC: 372 UG/DL
TSH SERPL-ACNC: 1.62 UIU/ML
TTG IGA SER IA-ACNC: 3.9 U/ML
TTG IGA SER-ACNC: NEGATIVE
TTG IGG SER IA-ACNC: 9.7 U/ML
TTG IGG SER IA-ACNC: POSITIVE
UIBC SERPL-MCNC: 301 UG/DL
VIT A SERPL-MCNC: 47.4 UG/DL
VIT B1 SERPL-MCNC: 123.3 NMOL/L
VIT B12 SERPL-MCNC: 634 PG/ML
VIT B6 SERPL-MCNC: 10.4 UG/L
WBC # FLD AUTO: 6.23 K/UL
ZINC SERPL-MCNC: 89 UG/DL

## 2022-04-04 ENCOUNTER — NON-APPOINTMENT (OUTPATIENT)
Age: 62
End: 2022-04-04

## 2022-05-04 ENCOUNTER — APPOINTMENT (OUTPATIENT)
Dept: GASTROENTEROLOGY | Facility: CLINIC | Age: 62
End: 2022-05-04

## 2022-05-12 ENCOUNTER — APPOINTMENT (OUTPATIENT)
Dept: GASTROENTEROLOGY | Facility: CLINIC | Age: 62
End: 2022-05-12

## 2023-01-14 ENCOUNTER — OFFICE (OUTPATIENT)
Dept: URBAN - METROPOLITAN AREA CLINIC 109 | Facility: CLINIC | Age: 63
Setting detail: OPHTHALMOLOGY
End: 2023-01-14
Payer: COMMERCIAL

## 2023-01-14 DIAGNOSIS — H16.223: ICD-10-CM

## 2023-01-14 DIAGNOSIS — H01.002: ICD-10-CM

## 2023-01-14 DIAGNOSIS — H25.13: ICD-10-CM

## 2023-01-14 DIAGNOSIS — H10.231: ICD-10-CM

## 2023-01-14 DIAGNOSIS — H01.004: ICD-10-CM

## 2023-01-14 DIAGNOSIS — H01.001: ICD-10-CM

## 2023-01-14 DIAGNOSIS — H01.005: ICD-10-CM

## 2023-01-14 DIAGNOSIS — H11.153: ICD-10-CM

## 2023-01-14 DIAGNOSIS — H43.391: ICD-10-CM

## 2023-01-14 DIAGNOSIS — H35.362: ICD-10-CM

## 2023-01-14 PROCEDURE — 92020 GONIOSCOPY: CPT | Performed by: OPHTHALMOLOGY

## 2023-01-14 PROCEDURE — 92134 CPTRZ OPH DX IMG PST SGM RTA: CPT | Performed by: OPHTHALMOLOGY

## 2023-01-14 PROCEDURE — 83861 MICROFLUID ANALY TEARS: CPT | Performed by: OPHTHALMOLOGY

## 2023-01-14 PROCEDURE — 92014 COMPRE OPH EXAM EST PT 1/>: CPT | Performed by: OPHTHALMOLOGY

## 2023-01-14 ASSESSMENT — REFRACTION_AUTOREFRACTION
OS_AXIS: 71
OD_AXIS: 103
OS_CYLINDER: -0.50
OD_SPHERE: -1.25
OS_SPHERE: -1.00
OD_CYLINDER: -0.75

## 2023-01-14 ASSESSMENT — VISUAL ACUITY
OD_BCVA: 20/20
OS_BCVA: 20/20-1

## 2023-01-14 ASSESSMENT — SPHEQUIV_DERIVED
OD_SPHEQUIV: -1.625
OS_SPHEQUIV: -1.25

## 2023-01-14 ASSESSMENT — REFRACTION_CURRENTRX
OS_OVR_VA: 20/
OS_ADD: +2.25
OD_SPHERE: -0.75
OS_SPHERE: -1.25
OD_CYLINDER: -0.75
OD_OVR_VA: 20/
OD_AXIS: 090
OD_ADD: +2.25

## 2023-01-14 ASSESSMENT — TONOMETRY
OS_IOP_MMHG: 15
OD_IOP_MMHG: 17

## 2023-01-14 ASSESSMENT — CONFRONTATIONAL VISUAL FIELD TEST (CVF)
OD_FINDINGS: FULL
OS_FINDINGS: FULL

## 2023-01-14 ASSESSMENT — LID EXAM ASSESSMENTS
OD_BLEPHARITIS: 1+
OS_BLEPHARITIS: 1+

## 2023-05-12 ENCOUNTER — APPOINTMENT (OUTPATIENT)
Dept: OBGYN | Facility: CLINIC | Age: 63
End: 2023-05-12
Payer: COMMERCIAL

## 2023-05-12 VITALS
WEIGHT: 135 LBS | HEIGHT: 65 IN | DIASTOLIC BLOOD PRESSURE: 70 MMHG | BODY MASS INDEX: 22.49 KG/M2 | SYSTOLIC BLOOD PRESSURE: 105 MMHG

## 2023-05-12 DIAGNOSIS — Z13.820 ENCOUNTER FOR SCREENING FOR OSTEOPOROSIS: ICD-10-CM

## 2023-05-12 PROCEDURE — 82270 OCCULT BLOOD FECES: CPT

## 2023-05-12 PROCEDURE — 99396 PREV VISIT EST AGE 40-64: CPT

## 2023-05-12 NOTE — PHYSICAL EXAM
[Appropriately responsive] : appropriately responsive [Alert] : alert [No Acute Distress] : no acute distress [No Lymphadenopathy] : no lymphadenopathy [Soft] : soft [Non-tender] : non-tender [Non-distended] : non-distended [No HSM] : No HSM [No Lesions] : no lesions [No Mass] : no mass [Oriented x3] : oriented x3 [Examination Of The Breasts] : a normal appearance [No Masses] : no breast masses were palpable [Labia Majora] : normal [Labia Minora] : normal [Normal] : normal [Uterine Adnexae] : normal [FreeTextEntry9] : stool guiac neg

## 2023-05-12 NOTE — HISTORY OF PRESENT ILLNESS
[FreeTextEntry1] : 63 yo G0 with LMP 2005 for annual\par \par Prior vaginal PT for frequent UTIs and inability to have intercourse.\par Using estradiol tablets with success.\par \par Pap all normal\par \par No PMB.  No oral HRT\par \par Celiac known. She has elevated liver enzymes in past. Pt now totally gluten free for resolution of changes.\par \par \par  [Mammogramdate] : 3/4/2022 [TextBox_19] : BIRADS 1 [PapSmeardate] : 2/2022 [TextBox_31] : NL/HPV neg  [ColonoscopyDate] : 2022

## 2023-05-12 NOTE — DISCUSSION/SUMMARY
[FreeTextEntry1] : Health Maintenance: \par \par -Pap with HPV\par -Mammo Rx\par -TBSE\par -colonoscopy guidelines reviewed with patient- stool guiac neg\par -Achieve Vit D levels of 30-40, intake of 1100 mg daily calcium mostly thru dark green\par leafy greens and milk products, exercise 30 minutes TIW \par \par Atrophic vaginitis\par - Renewed Vagifem\par -Last CMP 3/2022, with nl AST/ALT, advised to have baseline CMP with GI prior to starting Vagifem\par -Advised to have f/u CMP ~ 4 weeks thereafter

## 2023-05-23 DIAGNOSIS — R74.8 ABNORMAL LEVELS OF OTHER SERUM ENZYMES: ICD-10-CM

## 2023-06-27 ENCOUNTER — APPOINTMENT (OUTPATIENT)
Dept: OBGYN | Facility: CLINIC | Age: 63
End: 2023-06-27
Payer: COMMERCIAL

## 2023-06-27 DIAGNOSIS — M81.0 AGE-RELATED OSTEOPOROSIS W/OUT CURRENT PATHOLOGICAL FRACTURE: ICD-10-CM

## 2023-06-27 PROCEDURE — 99214 OFFICE O/P EST MOD 30 MIN: CPT | Mod: 95

## 2023-06-27 NOTE — DISCUSSION/SUMMARY
[FreeTextEntry1] : Vit D 1000 IUs daily, calcium of 1100mg daily thru diet of dark green leafy vegetables, low-fat milk products and exercise TIW.\par \par Detailed discussion of calcium from foods\par Taking Vit D3 2000 IUs daily. discussed benefits of taking with fatty meal.\par \par Exercise can be improved.\par \par Pharmacological therapy discussed with patient.\par Would like to start Fosamax 70 mg \par Endorse taking with 8 oz water and waiting 30 minutes to eat or drink anything and not to lay down.\par RTO 5 months\par IF GERD follows, suggest having endoscopy sooner\par Offered endocrinology apt for patient.

## 2023-06-27 NOTE — HISTORY OF PRESENT ILLNESS
[Home] : at home, [unfilled] , at the time of the visit. [Medical Office: ([facility])___] : at the medical office located in  [Verbal consent obtained from patient] : the patient, [unfilled] [FreeTextEntry1] : 61 yo G0 with LMP 2005 for osteoporosis discussion\par \par Prior vaginal PT for frequent UTIs and inability to have intercourse.\par Using estradiol tablets with success.\par \par Pap all normal\par \par No PMB.  No oral HRT\par \par Celiac known. She has elevated liver enzymes in past. Pt now totally gluten free for resolution of changes.\par Planning endoscopy this fall. \par \par \par

## 2024-01-22 ENCOUNTER — OFFICE (OUTPATIENT)
Dept: URBAN - METROPOLITAN AREA CLINIC 109 | Facility: CLINIC | Age: 64
Setting detail: OPHTHALMOLOGY
End: 2024-01-22
Payer: COMMERCIAL

## 2024-01-22 DIAGNOSIS — H43.393: ICD-10-CM

## 2024-01-22 DIAGNOSIS — H01.004: ICD-10-CM

## 2024-01-22 DIAGNOSIS — H01.005: ICD-10-CM

## 2024-01-22 DIAGNOSIS — H01.001: ICD-10-CM

## 2024-01-22 DIAGNOSIS — H35.362: ICD-10-CM

## 2024-01-22 DIAGNOSIS — H11.153: ICD-10-CM

## 2024-01-22 DIAGNOSIS — H01.002: ICD-10-CM

## 2024-01-22 DIAGNOSIS — H25.13: ICD-10-CM

## 2024-01-22 DIAGNOSIS — H16.223: ICD-10-CM

## 2024-01-22 PROCEDURE — 92134 CPTRZ OPH DX IMG PST SGM RTA: CPT | Performed by: OPHTHALMOLOGY

## 2024-01-22 PROCEDURE — 92201 OPSCPY EXTND RTA DRAW UNI/BI: CPT | Performed by: OPHTHALMOLOGY

## 2024-01-22 PROCEDURE — 83861 MICROFLUID ANALY TEARS: CPT | Mod: QW | Performed by: OPHTHALMOLOGY

## 2024-01-22 PROCEDURE — 92014 COMPRE OPH EXAM EST PT 1/>: CPT | Performed by: OPHTHALMOLOGY

## 2024-01-22 PROCEDURE — 92020 GONIOSCOPY: CPT | Performed by: OPHTHALMOLOGY

## 2024-01-22 ASSESSMENT — CONFRONTATIONAL VISUAL FIELD TEST (CVF)
OS_FINDINGS: FULL
OD_FINDINGS: FULL

## 2024-01-22 ASSESSMENT — REFRACTION_CURRENTRX
OD_SPHERE: -0.75
OS_ADD: +2.25
OD_OVR_VA: 20/
OS_OVR_VA: 20/
OD_ADD: +2.25
OS_SPHERE: -1.25
OD_CYLINDER: -0.75
OD_AXIS: 090

## 2024-01-22 ASSESSMENT — REFRACTION_AUTOREFRACTION
OS_SPHERE: -0.75
OD_AXIS: 111
OS_AXIS: 083
OD_SPHERE: -0.25
OS_CYLINDER: -0.50
OD_CYLINDER: -1.00

## 2024-01-22 ASSESSMENT — SPHEQUIV_DERIVED
OD_SPHEQUIV: -0.75
OS_SPHEQUIV: -1

## 2024-01-22 ASSESSMENT — LID EXAM ASSESSMENTS
OD_BLEPHARITIS: 1+
OS_BLEPHARITIS: 1+

## 2024-02-21 ENCOUNTER — RX RENEWAL (OUTPATIENT)
Age: 64
End: 2024-02-21

## 2024-03-25 ENCOUNTER — TRANSCRIPTION ENCOUNTER (OUTPATIENT)
Age: 64
End: 2024-03-25

## 2024-03-29 LAB
25(OH)D3 SERPL-MCNC: 36 NG/ML
ALBUMIN SERPL ELPH-MCNC: 4.4 G/DL
ALP BLD-CCNC: 119 U/L
ALT SERPL-CCNC: 50 U/L
ANION GAP SERPL CALC-SCNC: 9 MMOL/L
AST SERPL W P-5'-P-CCNC: 38 IU/L
AST SERPL-CCNC: 35 U/L
BASOPHILS # BLD AUTO: 0.04 K/UL
BASOPHILS NFR BLD AUTO: 0.7 %
BETA CAROTENE: 96 UG/DL
BILIRUB SERPL-MCNC: 0.4 MG/DL
BUN SERPL-MCNC: 13 MG/DL
CALCIUM SERPL-MCNC: 10 MG/DL
CHLORIDE SERPL-SCNC: 104 MMOL/L
CHOLEST SERPL-MCNC: 183 MG/DL
CO2 SERPL-SCNC: 26 MMOL/L
COMMENT:: NORMAL
COPPER SERPL-MCNC: 140 UG/DL
CREAT SERPL-MCNC: 0.8 MG/DL
EGFR: 83 ML/MIN/1.73M2
ENDOMYSIUM IGA SER QL: NEGATIVE
ENDOMYSIUM IGA TITR SER: NORMAL
EOSINOPHIL # BLD AUTO: 0.22 K/UL
EOSINOPHIL NFR BLD AUTO: 4 %
FERRITIN SERPL-MCNC: 25 NG/ML
FIBROSIS STAGE SERPL QL: NORMAL
FIBROSURE ALPHA 2 MACROGLOBULINS: 188 MG/DL
FIBROSURE ALT (SGPT): 53 IU/L
FIBROSURE APOLIPOPROTEIN A1: 135 MG/DL
FIBROSURE GGT: 59 IU/L
FIBROSURE HAPTOGLOBIN: 88 MG/DL
FIBROSURE SCORING: NORMAL
FIBROSURE TOTAL BILIRUBIN: 0.3 MG/DL
FOLATE SERPL-MCNC: 18 NG/ML
GGT SERPL-CCNC: 65 U/L
GLIADIN IGA SER QL: 13.9 UNITS
GLIADIN IGG SER QL: 19.6 UNITS
GLIADIN PEPTIDE IGA SER-ACNC: NEGATIVE
GLIADIN PEPTIDE IGG SER-ACNC: NEGATIVE
GLUCOSE SERPL-MCNC: 88 MG/DL
GLUCOSE SERPL-MCNC: 90 MG/DL
HCT VFR BLD CALC: 39.9 %
HGB BLD-MCNC: 13.5 G/DL
IGA SER QL IEP: 310 MG/DL
IMM GRANULOCYTES NFR BLD AUTO: 0 %
INTERPRETATIONS:: NORMAL
IRON SATN MFR SERPL: 22 %
IRON SERPL-MCNC: 91 UG/DL
LIVER FIBR SCORE SERPL CALC.FIBROSURE: 0.26
LYMPHOCYTES # BLD AUTO: 1.59 K/UL
LYMPHOCYTES NFR BLD AUTO: 28.8 %
Lab: NORMAL
MAGNESIUM SERPL-MCNC: 2.1 MG/DL
MAN DIFF?: NORMAL
MCHC RBC-ENTMCNC: 30.8 PG
MCHC RBC-ENTMCNC: 33.8 GM/DL
MCV RBC AUTO: 90.9 FL
MENADIONE SERPL-MCNC: 0.61 NG/ML
MONOCYTES # BLD AUTO: 0.41 K/UL
MONOCYTES NFR BLD AUTO: 7.4 %
NASH SCORING: NORMAL
NECROINFLAMMATORY ACT GRADE SERPL QL: NORMAL
NECROINFLAMMATORY ACT SCORE SERPL: 0.57
NEUTROPHILS # BLD AUTO: 3.26 K/UL
NEUTROPHILS NFR BLD AUTO: 59.1 %
NICOTINAMIDE: 36.4 NG/ML
NICOTINIC ACID: <5 NG/ML
PANTOTHENATE SERPLBLD-MCNC: 93.7 NG/ML
PLATELET # BLD AUTO: 230 K/UL
POTASSIUM SERPL-SCNC: 4.4 MMOL/L
PROT SERPL-MCNC: 7.2 G/DL
RBC # BLD: 4.39 M/UL
RBC # FLD: 11.9 %
SELENIUM SERPL-MCNC: 146 UG/L
SERVICE CMNT-IMP: NORMAL
SODIUM SERPL-SCNC: 139 MMOL/L
STEATOSIS GRADE: NORMAL
STEATOSIS SCORE: 0.59
STEATOSIS SCORING: NORMAL
TIBC SERPL-MCNC: 407 UG/DL
TRIGL SERPL-MCNC: 67 MG/DL
TSH SERPL-ACNC: 2.03 UIU/ML
TTG IGA SER IA-ACNC: 3.7 U/ML
TTG IGA SER-ACNC: NEGATIVE
TTG IGG SER IA-ACNC: 10.4 U/ML
TTG IGG SER IA-ACNC: POSITIVE
UIBC SERPL-MCNC: 316 UG/DL
VIT A SERPL-MCNC: 47.6 UG/DL
VIT B1 SERPL-MCNC: 122.5 NMOL/L
VIT B12 SERPL-MCNC: 799 PG/ML
VIT B2 SERPL-MCNC: 310 UG/L
VIT B6 SERPL-MCNC: 38.7 UG/L
VIT C SERPL-MCNC: 0.9 MG/DL
WBC # FLD AUTO: 5.52 K/UL
ZINC SERPL-MCNC: 81 UG/DL

## 2024-05-29 ENCOUNTER — APPOINTMENT (OUTPATIENT)
Dept: GASTROENTEROLOGY | Facility: CLINIC | Age: 64
End: 2024-05-29
Payer: COMMERCIAL

## 2024-05-29 VITALS
TEMPERATURE: 97.3 F | WEIGHT: 133.38 LBS | HEIGHT: 65 IN | BODY MASS INDEX: 22.22 KG/M2 | OXYGEN SATURATION: 99 % | HEART RATE: 83 BPM | SYSTOLIC BLOOD PRESSURE: 120 MMHG | DIASTOLIC BLOOD PRESSURE: 70 MMHG

## 2024-05-29 DIAGNOSIS — K90.0 CELIAC DISEASE: ICD-10-CM

## 2024-05-29 DIAGNOSIS — K59.00 CONSTIPATION, UNSPECIFIED: ICD-10-CM

## 2024-05-29 DIAGNOSIS — K21.00 GASTRO-ESOPHAGEAL REFLUX DISEASE WITH ESOPHAGITIS, WITHOUT BLEEDING: ICD-10-CM

## 2024-05-29 DIAGNOSIS — K59.09 OTHER CONSTIPATION: ICD-10-CM

## 2024-05-29 DIAGNOSIS — Z12.11 ENCOUNTER FOR SCREENING FOR MALIGNANT NEOPLASM OF COLON: ICD-10-CM

## 2024-05-29 DIAGNOSIS — K76.0 FATTY (CHANGE OF) LIVER, NOT ELSEWHERE CLASSIFIED: ICD-10-CM

## 2024-05-29 DIAGNOSIS — R79.89 OTHER SPECIFIED ABNORMAL FINDINGS OF BLOOD CHEMISTRY: ICD-10-CM

## 2024-05-29 PROCEDURE — 99215 OFFICE O/P EST HI 40 MIN: CPT

## 2024-05-29 RX ORDER — ESTRADIOL 10 UG/1
10 TABLET VAGINAL
Qty: 8 | Refills: 5 | Status: DISCONTINUED | COMMUNITY
Start: 2017-08-08 | End: 2024-05-29

## 2024-05-29 RX ORDER — MULTIVITAMIN
TABLET ORAL
Refills: 0 | Status: ACTIVE | COMMUNITY

## 2024-05-29 RX ORDER — CHOLECALCIFEROL (VITAMIN D3) 50 MCG
CAPSULE ORAL
Refills: 0 | Status: DISCONTINUED | COMMUNITY
End: 2024-05-29

## 2024-05-29 RX ORDER — ESTRADIOL 10 UG/1
10 TABLET, FILM COATED VAGINAL
Qty: 24 | Refills: 1 | Status: DISCONTINUED | COMMUNITY
Start: 2019-12-05 | End: 2024-05-29

## 2024-05-29 RX ORDER — SODIUM PICOSULFATE, MAGNESIUM OXIDE, AND ANHYDROUS CITRIC ACID 12; 3.5; 1 G/175ML; G/175ML; MG/175ML
10-3.5-12 MG-GM LIQUID ORAL
Qty: 2 | Refills: 0 | Status: ACTIVE | COMMUNITY
Start: 2024-05-29 | End: 1900-01-01

## 2024-05-29 RX ORDER — LINACLOTIDE 290 UG/1
290 CAPSULE, GELATIN COATED ORAL
Qty: 90 | Refills: 1 | Status: DISCONTINUED | COMMUNITY
Start: 2022-03-16 | End: 2024-05-29

## 2024-05-29 RX ORDER — COLD-HOT PACK
EACH MISCELLANEOUS
Refills: 0 | Status: DISCONTINUED | COMMUNITY
End: 2024-05-29

## 2024-05-29 RX ORDER — ACETAMINOPHEN 325 MG
TABLET ORAL
Refills: 0 | Status: ACTIVE | COMMUNITY

## 2024-05-29 RX ORDER — ALENDRONATE SODIUM 70 MG/1
70 TABLET ORAL
Qty: 12 | Refills: 1 | Status: DISCONTINUED | COMMUNITY
Start: 2023-06-27 | End: 2024-05-29

## 2024-05-29 RX ORDER — MULTIVIT-MIN/IRON/FOLIC ACID/K 18-600-40
CAPSULE ORAL
Refills: 0 | Status: DISCONTINUED | COMMUNITY
End: 2024-05-29

## 2024-05-29 NOTE — HISTORY OF PRESENT ILLNESS
[FreeTextEntry1] : The patient has not been seen in over 2 years.  She has a history of celiac disease, reflux esophagitis, and constipation.  She had blood work done on March 22, 2024 which revealed a tissue transglutaminase IgG of 10.4, which is positive, with the other celiac markers being negative.  ALT was elevated at 50 with GGT of 65.  [facility]roSure testing revealed S2-3, N2, F2 0-1 disease.  She reports being strictly gluten-free.  She had a rash across her back which reportedly was biopsied by dermatology and was told that this was not celiac disease.  The rash lasted about 3 months and resolved about a month ago.  The patient was given steroid creams and oral antihistamines.  She denies abdominal pain or bloating.  She gets occasional heartburn for which she takes Tums on occasion.  She denies dysphagia, nausea, vomiting.  The patient uses MiraLAX daily and has 1 soft bowel movement a day although she still needs to use digital manipulation to evacuate.  She denies melena or bright red blood per rectum.  The patient has gained weight.  She never took the Linzess given to her at her last visit.  The patient has not been admitted to the hospital in the past year and denies any cardiac issues.   Note from 3/16/2022 - The patient has celiac disease.  She is seen for the first time in a year.  Blood work from her last visit in March 2021 showed a tissue transglutaminase IgG of 10.1 with otherwise normal celiac labs.  GGTP was mildly elevated at 46.  The patient has been on a strict gluten-free diet although she did have 1 inadvertent gluten exposure.  She denies abdominal pain or bloating.  She has chronic constipation for which she has been using digital manipulation for 40 years.  She takes MiraLAX about 3-4 times a week.  With the digital manipulation, she moves her bowels daily.  She denies melena or bright red blood per rectum.  She has very rare heartburn and denies dysphagia.  Patient's weight is stable.  The patient has not been admitted to the hospital in the past year and denies any cardiac issues.   Note from 3/4/2021 - The patient has celiac disease.  We reviewed the evaluations done since the patient's last visit on November 2, 2020.  Blood work showed normal celiac labs except for a weakly positive tissue transglutaminase IgG.  AST and ALT have returned to normal at 28 and 39 respectively with an alkaline phosphatase of 120 although GGTP remains elevated at 62.  An abdominal ultrasound performed on November 11, 2020 was normal.  The patient remains on a strict gluten-free diet.  She denies abdominal pain, bloating, nausea, vomiting, heartburn, dysphagia.  She has constipation and manually disimpacted herself daily to move her bowels.  She denies melena or bright red blood per rectum.  She has only rare alcohol intake.   Note from 11/2/2020 - The patient is status post EGD performed on October 16, 2020.  The examination was significant for a 1 cm hiatal hernia.  Biopsy showed evidence of reflux esophagitis at the GE junction.  Villi were normal.  The patient had blood work performed on October 24, 2020 which showed elevated liver tests with an alkaline phosphatase of 136, AST 60, and ALT 82.  On April 26, 2020, all of these labs were normal.  However, previous to this, the patient has had intermittently abnormal transaminases but never an abnormal alkaline phosphatase.  Previous evaluation for multiple causes of chronic liver disease were negative.  The patient feels well denying abdominal pain, heartburn, dysphagia, nausea, vomiting.  She moves her bowels once a day using digital manipulation for many years.  The patient has been on a strict 100% gluten-free diet.   Note from 5/12/2020 - The patient was scheduled for a telehealth visit but her Internet was not working and the visit was converted to a telephonic visit.  The patient has celiac disease and reports being on a 100% gluten-free diet.  We reviewed her blood work from April 21, 2020 which was significant for a weakly positive tissue transglutaminase IgG of 6.3 and a vitamin D level of 28.3.  The patient is now on vitamin D3 supplementation.  She reports being strictly gluten-free and is very conscious about contact and cross-contamination.  She does note that she uses a lot of Chapstick, which may not be gluten-free.  She feels well denying abdominal pain, heartburn, dysphasia.  Bowel movements are normal without melena or bright red blood per rectum.  The patient's weight is stable.  The patient has not been admitted to the hospital in the past year and denies any cardiac issues.   Note from 12/9/2019 - The patient has celiac disease.  Her blood work from her last visit in August was improved with weakly positive tissue transglutaminase IgG and IgA but normal deaminase gliadin antibodies.  The patient reports that she is on a 100% gluten-free diet.  She denies abdominal pain or bloating.  Bowel movements are normal.   Note from 8/1/2019 - We reviewed the blood work from the patient's last visit on June 11, 2019. There is improvement of the celiac labs although the tissue transglutaminase IgA weakly positive as is the tissue transglutaminase IgG and the anti-deamidated gliadin antibody IgG. 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 The patient states that she has been on a 100% gluten free diet. She saw the nutritionist and understands the requirements of the diet. She denies abdominal pain or bloating. She has one solid bowel movement a day without melena or bright blood per rectum. Patient has lost 3 pounds intentionally.   Note from 6/11/19 - The patient is a 58-year-old woman previously followed by my partner Dr. Alicia who is referred to me because of a new diagnosis of celiac disease. I reviewed her blood work from January 9, 2019 which revealed a tissue transglutaminase IgA of 93.4 with an elevated endomysial antibody and an ALT of 55. The patient underwent EGD and colonoscopy on May 9, 2019. EGD showed possible scalloping along with a hiatal hernia. Biopsies showed mildly increased intraepithelial lymphocytes in the distal duodenum and duodenal bulb. Colonoscopy was significant for diverticulosis and a hyperplastic polyp.  The patient has been on a gluten-free diet. She denies abdominal pain, bloating, heartburn, dysphagia. She has one solid bowel movement a day without melena or prior blood per rectum. She has lost 5 pounds intentionally. She denies any rashes, joint pains, or dizziness. The patient has a history of osteopenia and had a bone density scan within the past year.   The patient has not been hospitalized in the past year and denies any cardiac issues.

## 2024-05-29 NOTE — ASSESSMENT
[FreeTextEntry1] : Patient with celiac disease who reports being on a strict gluten-free diet.  She still has a positive tissue transglutaminase IgG, which could indicate gluten exposure but also may never normalize.  She has a history of reflux esophagitis and has intermittent heartburn.  She has chronic constipation which is controlled with MiraLAX.  The patient has mildly elevated LFTs with evidence of moderate to severe fat in the liver with inflammation and possible early fibrosis based on blood work.  She is not overweight.  The patient has an appointment to see a hepatologist at [facility], Dr. Beyer.  She will keep this appointment in July.  Patient was counseled regarding the importance of keeping a strict gluten-free diet free of contact and cross-contamination.  An EGD and colonoscopy have been scheduled. The risks, benefits, alternatives, and limitations of the procedures, including the possibility of missed lesions, were explained.  We will take biopsies to assess the status of her celiac disease.   Plan from 3/16/2022 - Patient with celiac disease on a strict gluten-free diet.  She has chronic constipation for which she uses digital manipulation.  Bloodwork was sent for CBC, chem-pack, TSH, celiac markers, iron studies, B12, folate, vitamin A, vitamin D, vitamin K, magnesium, carotene, vitamin B6, zinc, copper, vitamin B1.  In an effort to avoid digital manipulation, the patient was started on Linzess 290 mcg a day.  Patient is due for colonoscopy in May 2024.  Patient will return to see me in 4 to 6 weeks to assess her response to the Linzess.   Plan from 3/4/2021 - Patient with celiac disease doing well on a gluten-free diet.  She has had intermittently mildly elevated transaminases and has a chronically elevated GGTP.  She has constipation requiring manual disimpaction.  Blood work was sent for LFTs, PT, alpha-1 antitrypsin, alpha-fetoprotein, VENESSA, ANCA, ceruloplasmin, iron studies, GGTP, celiac markers, hepatitis A., B., C. serologies, lipid profile, AMA, anti-smooth muscle antibody, anti-LKM antibody, anti-soluble liver antigen antibody, CBC, chem-pack, TSH, B12, folate, vitamin A, vitamin D, vitamin K, magnesium, carotene, vitamin B6, zinc.  Patient was advised to use MiraLAX 17 g in 8 ounces of water once a day.  Patient is due for colonoscopy in May 2024.   Plan from 11/2/2020 - Patient with celiac disease who is elevated AST, ALT, and alkaline phosphatase.  EGD revealed a 1 cm hiatal hernia with biopsy evidence of reflux esophagitis.  Bloodwork was sent for LFTs, AFP, hepatitis B and C serologies, celiac markers, iron studies, B12, folate, vitamin A, vitamin D, vitamin K, magnesium, carotene, vitamin B6, zinc.  Patient was sent for an abdominal ultrasound.  Patient is due for colonoscopy in May 2024.   Plan from 5/12/2020 - Patient with celiac disease who reports being on a 100% gluten-free diet.  Blood work was significant for a weakly positive tissue transglutaminase IgG.  She also has mildly decreased vitamin D.  Once the COVID-19 pandemic allows, an EGD will be scheduled. The risks, benefits, alternatives, and limitations of the procedure were explained.  I discussed with the patient the possibility that gluten is inadvertently entering her body.  We discussed the Chapstick and the patient will look for a gluten-free alternative.  We also discussed receiving the dietitian, although the patient will wait to see if the EGD shows findings of ongoing celiac damage.  Patient is due for colonoscopy in May 2024.   Plan from 12/9/2019 - Patient with celiac disease diagnosed in May who has been on a 100% gluten-free diet.  We have not yet seen full normalization of her serologies but they are improving.  Bloodwork was sent for CBC, chem-pack, TSH, celiac markers, iron studies, B12, folate, vitamin A, D, K, and magnesium.  Patient will return to see me in 4 months.  We will plan on repeating an EGD in May 2020.  Patient is due for colonoscopy in May 2024.   Plan from 8/1/2019 - Patient with celiac disease whose labs are improving but not yet normalized. She appears to be 100% gluten free diet and is being followed by a nutritionist.  I had a long discussion with the patient regarding celiac disease, the gluten-free diet, and the concepts of contact and cross contamination.  Bloodwork was sent for CBC, chem-pack, TSH, celiac markers, iron studies, B12, folate, vitamin D, and magnesium.  Patient will return to see me in 4 months.  We will plan on repeating EGD in May 2020 and colonoscopy in May 2024.   Plan from 6/11/19 - Patient with celiac serologies but a markedly abnormal suggestive of celiac disease. Endoscopic biopsies show mildly increased intraepithelial lymphocytes, a finding that can be seen in celiac disease but is not diagnostic of that. With the combination of the blood tests and biopsy results, assuming that the patient's celiac genetic testing is positive, this is consistent with a diagnosis of celiac disease. The patient has an elevated ALT. She had a recent colonoscopy revealing diverticulosis.  I had a long discussion with the patient regarding celiac disease, the gluten-free diet, the concepts of contact and cross contamination, and the potential complications of celiac disease. We also discussed the need for ongoing followup.  Information was given to the patient regarding celiac disease and a gluten free diet. The patient was also given a list of local restaurants that are "celiac friendly".  Blood work was sent for LFTs, PT, alpha-1 antitrypsin, alpha-fetoprotein, VENESSA, ANCA, ceruloplasmin, iron studies, GGTP, celiac markers, hepatitis A., B., C. serologies, lipid profile, AMA, anti-smooth muscle antibody, anti-LKM antibody, anti-soluble liver antigen antibody, CBC, chem-pack, TSH, B12, folate, vitamin D, and magnesium and celiac HLA testing.  The patient was given the name of a nutitionist, Wily Reeves, to see.  Information was given to the patient regarding diverticulosis and a high-fiber diet.  We will repeat a colonoscopy in 5-10 years.  Patient returned to see me in 2 months.

## 2024-05-29 NOTE — REASON FOR VISIT
[FreeTextEntry1] : Celiac disease, reflux esophagitis, constipation, screening colonoscopy, elevated liver tests, fatty liver

## 2024-05-29 NOTE — CONSULT LETTER
[FreeTextEntry1] : Dear Dr. Nancy Cardenas,  I had the pleasure of seeing your patient GLENYS EARL in the office today.  My office note is attached. PLEASE READ THE "ASSESSMENT" SECTION OF THE NOTE TO SEE MY IMPRESSION AND PLAN.  Thank you very much for allowing me to participate in the care of your patient.  Sincerely,  Parker Amanda M.D., FAC, FACP Director, Celiac Program at [facility]  of Medicine, [employer] at [facility] Adjunct  of Medicine, [facility] Practice Director, [facility] - Gastroenterology at [address] - [address] [place] [address] Tel: [phone] Email: [email or web address]   The attached note has been created using a voice recognition system (Dragon).  There may be some misspellings and typos.  Please call my office if you have any issues or questions.

## 2024-05-31 ENCOUNTER — APPOINTMENT (OUTPATIENT)
Dept: OBGYN | Facility: CLINIC | Age: 64
End: 2024-05-31
Payer: COMMERCIAL

## 2024-05-31 VITALS
HEIGHT: 65 IN | BODY MASS INDEX: 22.49 KG/M2 | SYSTOLIC BLOOD PRESSURE: 110 MMHG | WEIGHT: 135 LBS | DIASTOLIC BLOOD PRESSURE: 60 MMHG

## 2024-05-31 DIAGNOSIS — N95.2 POSTMENOPAUSAL ATROPHIC VAGINITIS: ICD-10-CM

## 2024-05-31 DIAGNOSIS — Z12.39 ENCOUNTER FOR OTHER SCREENING FOR MALIGNANT NEOPLASM OF BREAST: ICD-10-CM

## 2024-05-31 DIAGNOSIS — Z01.419 ENCOUNTER FOR GYNECOLOGICAL EXAMINATION (GENERAL) (ROUTINE) W/OUT ABNORMAL FINDINGS: ICD-10-CM

## 2024-05-31 PROCEDURE — 99396 PREV VISIT EST AGE 40-64: CPT

## 2024-05-31 PROCEDURE — 82270 OCCULT BLOOD FECES: CPT

## 2024-05-31 RX ORDER — CHROMIUM 200 MCG
1000 TABLET ORAL
Refills: 0 | Status: ACTIVE | COMMUNITY

## 2024-05-31 NOTE — DISCUSSION/SUMMARY
[FreeTextEntry1] : Health Maintenance: -Pap today -Mammo Rx -TBSE -colonoscopy guidelines reviewed with patient,  Nelda negative Osteopenia: -increase exercise. -prior use of Actonel weekly led to systemic symptoms. -Achieve Vit D levels of 30-40, intake of 1100 mg daily calcium mostly thru dark green -may repeat DEXA next year leafy greens and milk products, exercise 30 minutes TIW

## 2024-05-31 NOTE — PHYSICAL EXAM
[Appropriately responsive] : appropriately responsive [Alert] : alert [No Acute Distress] : no acute distress [No Lymphadenopathy] : no lymphadenopathy [Soft] : soft [Non-tender] : non-tender [Non-distended] : non-distended [No HSM] : No HSM [No Lesions] : no lesions [No Mass] : no mass [Oriented x3] : oriented x3 [Examination Of The Breasts] : a normal appearance [No Masses] : no breast masses were palpable [Labia Majora] : normal [Labia Minora] : normal [Normal] : normal [Uterine Adnexae] : normal [FreeTextEntry9] : Nelda neg

## 2024-05-31 NOTE — HISTORY OF PRESENT ILLNESS
[FreeTextEntry1] : 62 yo G0 with LMP 2005 for  annual visit  CC; newly dx high liver enzymes GGTP 40-60s         Known Celica Dudley  GYN: Prior vaginal PT for frequent UTIs and inability to have intercourse. Using estradiol tablets with success, but has stopped following liver enzyme elevation. sexually active, but using Bonefide REvaree since  stopping estradiol. Pap all normal No PMB.  No oral HRT  Celiac known. She has elevated liver enzymes in past. Pt now totally gluten free for resolution of changes.  SH:    [Mammogramdate] : 15/25/23 [TextBox_19] : BIRAAD 1 [PapSmeardate] : 2/18/22 [TextBox_31] : Pap and HPV [BoneDensityDate] : 5/23 [TextBox_37] : osteopenia (unable to tolerate Actonel) [ColonoscopyDate] : 2019

## 2024-06-03 LAB
CARD LOT #: NORMAL
CARD LOT EXP DATE: NORMAL
DATE COLLECTED: NORMAL
DEVELOPER LOT #: NORMAL
DEVELOPER LOT EXP DATE: NORMAL
HEMOCCULT SP1 STL QL: NEGATIVE
QUALITY CONTROL: YES

## 2024-06-04 LAB — CYTOLOGY CVX/VAG DOC THIN PREP: ABNORMAL

## 2024-08-01 ENCOUNTER — TRANSCRIPTION ENCOUNTER (OUTPATIENT)
Age: 64
End: 2024-08-01

## 2024-08-02 ENCOUNTER — TRANSCRIPTION ENCOUNTER (OUTPATIENT)
Age: 64
End: 2024-08-02

## 2024-08-21 ENCOUNTER — APPOINTMENT (OUTPATIENT)
Dept: CARDIOLOGY | Facility: CLINIC | Age: 64
End: 2024-08-21
Payer: COMMERCIAL

## 2024-08-21 DIAGNOSIS — K76.0 FATTY (CHANGE OF) LIVER, NOT ELSEWHERE CLASSIFIED: ICD-10-CM

## 2024-08-21 DIAGNOSIS — K90.0 CELIAC DISEASE: ICD-10-CM

## 2024-08-21 PROCEDURE — 97802 MEDICAL NUTRITION INDIV IN: CPT | Mod: 95

## 2024-09-30 ENCOUNTER — APPOINTMENT (OUTPATIENT)
Dept: GASTROENTEROLOGY | Facility: CLINIC | Age: 64
End: 2024-09-30

## 2024-10-02 ENCOUNTER — TRANSCRIPTION ENCOUNTER (OUTPATIENT)
Age: 64
End: 2024-10-02

## 2024-11-20 ENCOUNTER — APPOINTMENT (OUTPATIENT)
Dept: GASTROENTEROLOGY | Facility: AMBULATORY MEDICAL SERVICES | Age: 64
End: 2024-11-20
Payer: COMMERCIAL

## 2024-11-20 PROCEDURE — 43239 EGD BIOPSY SINGLE/MULTIPLE: CPT | Mod: 59

## 2024-11-20 PROCEDURE — 45380 COLONOSCOPY AND BIOPSY: CPT | Mod: 33

## 2025-02-05 ENCOUNTER — APPOINTMENT (OUTPATIENT)
Dept: GASTROENTEROLOGY | Facility: CLINIC | Age: 65
End: 2025-02-05
Payer: COMMERCIAL

## 2025-02-05 VITALS
WEIGHT: 137 LBS | HEART RATE: 81 BPM | DIASTOLIC BLOOD PRESSURE: 70 MMHG | OXYGEN SATURATION: 99 % | TEMPERATURE: 96.9 F | HEIGHT: 65 IN | SYSTOLIC BLOOD PRESSURE: 130 MMHG | BODY MASS INDEX: 22.82 KG/M2

## 2025-02-05 DIAGNOSIS — K21.00 GASTRO-ESOPHAGEAL REFLUX DISEASE WITH ESOPHAGITIS, WITHOUT BLEEDING: ICD-10-CM

## 2025-02-05 DIAGNOSIS — K90.0 CELIAC DISEASE: ICD-10-CM

## 2025-02-05 DIAGNOSIS — K57.30 DIVERTICULOSIS OF LARGE INTESTINE W/OUT PERFORATION OR ABSCESS W/OUT BLEEDING: ICD-10-CM

## 2025-02-05 DIAGNOSIS — K62.6 ULCER OF ANUS AND RECTUM: ICD-10-CM

## 2025-02-05 DIAGNOSIS — K44.9 DIAPHRAGMATIC HERNIA W/OUT OBSTRUCTION OR GANGRENE: ICD-10-CM

## 2025-02-05 DIAGNOSIS — K59.00 CONSTIPATION, UNSPECIFIED: ICD-10-CM

## 2025-02-05 PROCEDURE — 99214 OFFICE O/P EST MOD 30 MIN: CPT

## 2025-02-05 PROCEDURE — 36415 COLL VENOUS BLD VENIPUNCTURE: CPT

## 2025-02-12 LAB
25(OH)D3 SERPL-MCNC: 43.1 NG/ML
ALBUMIN SERPL ELPH-MCNC: 4.6 G/DL
ALP BLD-CCNC: 116 U/L
ALT SERPL-CCNC: 38 U/L
ANION GAP SERPL CALC-SCNC: 12 MMOL/L
AST SERPL-CCNC: 27 U/L
BASOPHILS # BLD AUTO: 0.03 K/UL
BASOPHILS NFR BLD AUTO: 0.5 %
BETA CAROTENE: 88 UG/DL
BILIRUB SERPL-MCNC: 0.2 MG/DL
BUN SERPL-MCNC: 15 MG/DL
CALCIUM SERPL-MCNC: 9.7 MG/DL
CHLORIDE SERPL-SCNC: 103 MMOL/L
CO2 SERPL-SCNC: 26 MMOL/L
COPPER SERPL-MCNC: 124 UG/DL
CREAT SERPL-MCNC: 0.75 MG/DL
EGFR: 89 ML/MIN/1.73M2
ENDOMYSIUM IGA SER QL: NEGATIVE
ENDOMYSIUM IGA TITR SER: NORMAL
EOSINOPHIL # BLD AUTO: 0.25 K/UL
EOSINOPHIL NFR BLD AUTO: 3.9 %
FERRITIN SERPL-MCNC: 38 NG/ML
FOLATE SERPL-MCNC: 16.8 NG/ML
GGT SERPL-CCNC: 55 U/L
GLIADIN IGA SER QL: 3.7 U/ML
GLIADIN IGG SER QL: <0.4 U/ML
GLIADIN PEPTIDE IGA SER-ACNC: NEGATIVE
GLIADIN PEPTIDE IGG SER-ACNC: NEGATIVE
GLUCOSE SERPL-MCNC: 84 MG/DL
HCT VFR BLD CALC: 37.2 %
HGB BLD-MCNC: 12.4 G/DL
IGA SER QL IEP: 250 MG/DL
IMM GRANULOCYTES NFR BLD AUTO: 0.2 %
IRON SATN MFR SERPL: 22 %
IRON SERPL-MCNC: 84 UG/DL
LYMPHOCYTES # BLD AUTO: 1.66 K/UL
LYMPHOCYTES NFR BLD AUTO: 25.6 %
MAGNESIUM SERPL-MCNC: 2.2 MG/DL
MAN DIFF?: NORMAL
MCHC RBC-ENTMCNC: 31.9 PG
MCHC RBC-ENTMCNC: 33.3 G/DL
MCV RBC AUTO: 95.6 FL
MENADIONE SERPL-MCNC: 1.44 NG/ML
MONOCYTES # BLD AUTO: 0.43 K/UL
MONOCYTES NFR BLD AUTO: 6.6 %
NEUTROPHILS # BLD AUTO: 4.1 K/UL
NEUTROPHILS NFR BLD AUTO: 63.2 %
PANTOTHENATE SERPLBLD-MCNC: 67.5 NG/ML
PLATELET # BLD AUTO: 240 K/UL
POTASSIUM SERPL-SCNC: 3.8 MMOL/L
PROT SERPL-MCNC: 7.4 G/DL
RBC # BLD: 3.89 M/UL
RBC # FLD: 12.1 %
SODIUM SERPL-SCNC: 140 MMOL/L
TIBC SERPL-MCNC: 388 UG/DL
TSH SERPL-ACNC: 1.56 UIU/ML
TTG IGA SER IA-ACNC: 9.7 U/ML
TTG IGA SER-ACNC: NEGATIVE
TTG IGG SER IA-ACNC: 1 U/ML
TTG IGG SER IA-ACNC: NEGATIVE
UIBC SERPL-MCNC: 304 UG/DL
VIT A SERPL-MCNC: 51.4 UG/DL
VIT B1 SERPL-MCNC: 113.5 NMOL/L
VIT B12 SERPL-MCNC: 1168 PG/ML
VIT B2 SERPL-MCNC: 292 UG/L
VIT B6 SERPL-MCNC: 27 UG/L
VIT C SERPL-MCNC: 0.9 MG/DL
WBC # FLD AUTO: 6.48 K/UL
ZINC SERPL-MCNC: 66 UG/DL

## 2025-02-13 ENCOUNTER — OFFICE (OUTPATIENT)
Dept: URBAN - METROPOLITAN AREA CLINIC 109 | Facility: CLINIC | Age: 65
Setting detail: OPHTHALMOLOGY
End: 2025-02-13
Payer: COMMERCIAL

## 2025-02-13 DIAGNOSIS — H11.153: ICD-10-CM

## 2025-02-13 DIAGNOSIS — H01.001: ICD-10-CM

## 2025-02-13 DIAGNOSIS — H35.371: ICD-10-CM

## 2025-02-13 DIAGNOSIS — H25.13: ICD-10-CM

## 2025-02-13 DIAGNOSIS — H43.391: ICD-10-CM

## 2025-02-13 DIAGNOSIS — H01.004: ICD-10-CM

## 2025-02-13 DIAGNOSIS — H35.362: ICD-10-CM

## 2025-02-13 DIAGNOSIS — H16.223: ICD-10-CM

## 2025-02-13 DIAGNOSIS — H01.002: ICD-10-CM

## 2025-02-13 DIAGNOSIS — H01.005: ICD-10-CM

## 2025-02-13 PROCEDURE — 92134 CPTRZ OPH DX IMG PST SGM RTA: CPT | Performed by: OPHTHALMOLOGY

## 2025-02-13 PROCEDURE — 92020 GONIOSCOPY: CPT | Performed by: OPHTHALMOLOGY

## 2025-02-13 PROCEDURE — 92014 COMPRE OPH EXAM EST PT 1/>: CPT | Performed by: OPHTHALMOLOGY

## 2025-02-13 PROCEDURE — 92202 OPSCPY EXTND ON/MAC DRAW: CPT | Performed by: OPHTHALMOLOGY

## 2025-02-13 ASSESSMENT — KERATOMETRY
OS_AXISANGLE_DEGREES: 095
OS_K2POWER_DIOPTERS: 48.00
OD_AXISANGLE_DEGREES: 044
OD_K2POWER_DIOPTERS: 48.00
OS_K1POWER_DIOPTERS: 47.50
OD_K1POWER_DIOPTERS: 47.50

## 2025-02-13 ASSESSMENT — REFRACTION_AUTOREFRACTION
OD_AXIS: 109
OS_CYLINDER: -0.50
OD_SPHERE: -0.50
OS_AXIS: 84
OD_CYLINDER: -1.25
OS_SPHERE: -1.00

## 2025-02-13 ASSESSMENT — LID EXAM ASSESSMENTS
OD_BLEPHARITIS: 1+
OS_MEIBOMITIS: LLL LUL T
OD_MEIBOMITIS: RLL RUL T
OS_BLEPHARITIS: 1+

## 2025-02-13 ASSESSMENT — REFRACTION_CURRENTRX
OD_CYLINDER: -0.75
OS_ADD: +2.25
OS_OVR_VA: 20/
OD_ADD: +2.25
OD_SPHERE: -0.75
OD_AXIS: 090
OS_SPHERE: -1.25
OD_OVR_VA: 20/

## 2025-02-13 ASSESSMENT — VISUAL ACUITY
OS_BCVA: 20/25
OD_BCVA: 20/25

## 2025-02-13 ASSESSMENT — CONFRONTATIONAL VISUAL FIELD TEST (CVF)
OD_FINDINGS: FULL
OS_FINDINGS: FULL

## 2025-02-13 ASSESSMENT — TONOMETRY
OD_IOP_MMHG: 14
OS_IOP_MMHG: 15

## 2025-03-18 ENCOUNTER — TRANSCRIPTION ENCOUNTER (OUTPATIENT)
Age: 65
End: 2025-03-18

## 2025-03-21 ENCOUNTER — APPOINTMENT (OUTPATIENT)
Dept: GASTROENTEROLOGY | Facility: CLINIC | Age: 65
End: 2025-03-21
Payer: COMMERCIAL

## 2025-03-21 DIAGNOSIS — K59.00 CONSTIPATION, UNSPECIFIED: ICD-10-CM

## 2025-03-21 PROCEDURE — 99213 OFFICE O/P EST LOW 20 MIN: CPT | Mod: 95

## 2025-04-22 ENCOUNTER — APPOINTMENT (OUTPATIENT)
Dept: GASTROENTEROLOGY | Facility: HOSPITAL | Age: 65
End: 2025-04-22

## 2025-04-22 ENCOUNTER — OUTPATIENT (OUTPATIENT)
Dept: OUTPATIENT SERVICES | Facility: HOSPITAL | Age: 65
LOS: 1 days | End: 2025-04-22
Payer: COMMERCIAL

## 2025-04-22 VITALS
DIASTOLIC BLOOD PRESSURE: 67 MMHG | HEART RATE: 69 BPM | RESPIRATION RATE: 17 BRPM | WEIGHT: 132.94 LBS | SYSTOLIC BLOOD PRESSURE: 124 MMHG | OXYGEN SATURATION: 100 % | HEIGHT: 64 IN | TEMPERATURE: 98 F

## 2025-04-22 DIAGNOSIS — K59.09 OTHER CONSTIPATION: ICD-10-CM

## 2025-04-22 PROCEDURE — 91122 ANORECTAL MANOMETRY: CPT | Mod: 26

## 2025-04-22 PROCEDURE — 91120: CPT | Mod: 26

## 2025-06-03 ENCOUNTER — APPOINTMENT (OUTPATIENT)
Dept: OBGYN | Facility: CLINIC | Age: 65
End: 2025-06-03
Payer: COMMERCIAL

## 2025-06-03 ENCOUNTER — NON-APPOINTMENT (OUTPATIENT)
Age: 65
End: 2025-06-03

## 2025-06-03 VITALS
BODY MASS INDEX: 22.99 KG/M2 | SYSTOLIC BLOOD PRESSURE: 120 MMHG | WEIGHT: 138 LBS | HEIGHT: 65 IN | DIASTOLIC BLOOD PRESSURE: 74 MMHG

## 2025-06-03 DIAGNOSIS — Z12.39 ENCOUNTER FOR OTHER SCREENING FOR MALIGNANT NEOPLASM OF BREAST: ICD-10-CM

## 2025-06-03 DIAGNOSIS — Z13.820 ENCOUNTER FOR SCREENING FOR OSTEOPOROSIS: ICD-10-CM

## 2025-06-03 DIAGNOSIS — N95.2 POSTMENOPAUSAL ATROPHIC VAGINITIS: ICD-10-CM

## 2025-06-03 DIAGNOSIS — Z01.419 ENCOUNTER FOR GYNECOLOGICAL EXAMINATION (GENERAL) (ROUTINE) W/OUT ABNORMAL FINDINGS: ICD-10-CM

## 2025-06-03 PROCEDURE — 99396 PREV VISIT EST AGE 40-64: CPT

## (undated) DEVICE — SYR LUER LOK 50CC

## (undated) DEVICE — SYR LUER LOK 20CC

## (undated) DEVICE — STOPCOCK 4-WAY

## (undated) DEVICE — SYR LUER LOK 5CC

## (undated) DEVICE — TUBING SUCTION CONN 6FT STERILE

## (undated) DEVICE — SUCTION YANKAUER NO CONTROL VENT

## (undated) DEVICE — FOLEY HOLDER STATLOCK 2 WAY ADULT